# Patient Record
Sex: FEMALE | Race: WHITE | NOT HISPANIC OR LATINO | Employment: PART TIME | URBAN - METROPOLITAN AREA
[De-identification: names, ages, dates, MRNs, and addresses within clinical notes are randomized per-mention and may not be internally consistent; named-entity substitution may affect disease eponyms.]

---
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
## 2019-03-05 ENCOUNTER — OFFICE VISIT (OUTPATIENT)
Dept: MEDICAL GROUP | Facility: PHYSICIAN GROUP | Age: 19
End: 2019-03-05
Payer: COMMERCIAL

## 2019-03-05 VITALS
BODY MASS INDEX: 21.21 KG/M2 | OXYGEN SATURATION: 97 % | SYSTOLIC BLOOD PRESSURE: 120 MMHG | TEMPERATURE: 97.8 F | WEIGHT: 132 LBS | HEART RATE: 70 BPM | HEIGHT: 66 IN | DIASTOLIC BLOOD PRESSURE: 70 MMHG | RESPIRATION RATE: 14 BRPM

## 2019-03-05 DIAGNOSIS — K64.2 GRADE III HEMORRHOIDS: ICD-10-CM

## 2019-03-05 PROCEDURE — 99213 OFFICE O/P EST LOW 20 MIN: CPT | Performed by: NURSE PRACTITIONER

## 2019-03-05 RX ORDER — HYDROCORTISONE ACETATE 25 MG/1
25 SUPPOSITORY RECTAL EVERY 12 HOURS
Qty: 20 SUPPOSITORY | Refills: 0 | Status: SHIPPED | OUTPATIENT
Start: 2019-03-05 | End: 2019-07-15 | Stop reason: SDUPTHER

## 2019-03-05 NOTE — PROGRESS NOTES
Chief Complaint   Patient presents with   • Hemorrhoids     x 3 months; bright blood; first time        HISTORY OF THE PRESENT ILLNESS: This is a 18 y.o. female established patient who presents today for follow up.    Rectal pain  This is a new problem. Patient presents today with concerns for mild rectal pain acute onset 3 months ago. She was placed on Cefdinir at this time for otitis media. Her infection cleared, but she had frequent diarrhea while on this medication. She began to notice some swelling and pain in her rectal area which began at this time and has continued until today. She also has noticed streaks of blood when wiping, but denies any melanotic stools. Notes associated itching and irritation. She does not have any straining while using the bathroom.        Past Medical History:   Diagnosis Date   • Anxiety    • Depression    • Mononucleosis        History reviewed. No pertinent surgical history.    Family Status   Relation Status   • Mo Alive   • Fa Alive   • MGMo         hepatitis/liver CA   • Bro Alive     Family History   Problem Relation Age of Onset   • Hypertension Mother    • Psychiatry Mother         anxiety       Social History   Substance Use Topics   • Smoking status: Former Smoker     Years: 2.00     Types: Cigarettes     Quit date: 2018   • Smokeless tobacco: Never Used      Comment: one a day    • Alcohol use No       Allergies: Patient has no known allergies.    Current Outpatient Prescriptions Ordered in [facility]   Medication Sig Dispense Refill   • hydrocortisone (ANUSOL-HC) 25 MG Suppos Insert 1 Suppository in rectum every 12 hours. 20 Suppository 0   • SPRINTEC 28 0.25-35 MG-MCG per tablet Take 1 Tab by mouth every day. 28 Tab 11   • cefdinir (OMNICEF) 300 MG Cap Take 1 Cap by mouth 2 times a day. 20 Cap 0     No current Epic-ordered facility-administered medications on file.        Review of Systems   Gastrointestinal: Rectal pain, irritation, and bleeding.  Negative for  "melanotic stools, heartburn, nausea, vomiting and abdominal pain.   All other systems reviewed and are negative except as in HPI.    Exam: Blood pressure 120/70, pulse 70, temperature 36.6 °C (97.8 °F), temperature source Temporal, resp. rate 14, height 1.676 m (5' 6\"), weight 59.9 kg (132 lb), last menstrual period 02/20/2019, SpO2 97 %, not currently breastfeeding.  General:  Normal appearing. No distress.  Pulmonary:  Clear to ausculation.  Normal effort. No rales, ronchi, or wheezing.  Cardiovascular:  Regular rate and rhythm without murmur. Carotid and radial pulses are intact and equal bilaterally.  Rectal: Small soft mass at 7 o'clock on her anus with some redness. No fissures noted. Mild tenderness to palpation.  Neurologic:  Grossly nonfocal  Skin:  Warm and dry.  No obvious lesions.  Musculoskeletal:  Normal gait. No extremity cyanosis, clubbing, or edema.  Psych:  Normal mood and affect. Alert and oriented x3. Judgment and insight is normal.      PLAN:    1. Grade I hemorrhoids  Patient's story and physical exam are consistent with hemorrhoids. Provided with prescription for hydrocortisone and provided with application instructions.  - hydrocortisone (ANUSOL-HC) 25 MG Suppos; Insert 1 Suppository in rectum every 12 hours.  Dispense: 20 Suppository; Refill: 0       Please note that this dictation was created using voice recognition software. I have made every reasonable attempt to correct obvious errors, but I expect that there are errors of grammar and possibly content that I did not discover before finalizing the note.    Follow-up as needed. Patient is encouraged to be seen in the emergency room for chest pain, palpitations, shortness of breath, dizziness, severe abdominal pain or other concerning symptoms.      Assessment/Plan  1. Grade III hemorrhoids  hydrocortisone (ANUSOL-HC) 25 MG Suppos        IAaron (Scribe), am scribing for, and in the presence of, Chemo Caceres, " SHARI    Electronically signed by: Aaron Us (Scribe), 3/5/2019    Chemo JAMES, SHARI personally performed the services described in this documentation, as scribed by Aaron Us in my presence, and it is both accurate and complete.

## 2019-07-15 DIAGNOSIS — Z30.41 ENCOUNTER FOR SURVEILLANCE OF CONTRACEPTIVE PILLS: ICD-10-CM

## 2019-07-15 DIAGNOSIS — K64.2 GRADE III HEMORRHOIDS: ICD-10-CM

## 2019-07-15 RX ORDER — HYDROCORTISONE ACETATE 25 MG/1
25 SUPPOSITORY RECTAL EVERY 12 HOURS
Qty: 20 SUPPOSITORY | Refills: 0 | Status: SHIPPED
Start: 2019-07-15 | End: 2020-03-26 | Stop reason: CLARIF

## 2019-09-12 ENCOUNTER — APPOINTMENT (OUTPATIENT)
Dept: MEDICAL GROUP | Facility: PHYSICIAN GROUP | Age: 19
End: 2019-09-12
Payer: COMMERCIAL

## 2019-09-19 ENCOUNTER — OFFICE VISIT (OUTPATIENT)
Dept: MEDICAL GROUP | Facility: PHYSICIAN GROUP | Age: 19
End: 2019-09-19
Payer: COMMERCIAL

## 2019-09-19 ENCOUNTER — TELEPHONE (OUTPATIENT)
Dept: MEDICAL GROUP | Facility: PHYSICIAN GROUP | Age: 19
End: 2019-09-19

## 2019-09-19 VITALS
RESPIRATION RATE: 18 BRPM | WEIGHT: 131.4 LBS | OXYGEN SATURATION: 98 % | HEIGHT: 66 IN | BODY MASS INDEX: 21.12 KG/M2 | DIASTOLIC BLOOD PRESSURE: 78 MMHG | SYSTOLIC BLOOD PRESSURE: 118 MMHG | TEMPERATURE: 97.8 F | HEART RATE: 92 BPM

## 2019-09-19 DIAGNOSIS — K64.2 GRADE III HEMORRHOIDS: ICD-10-CM

## 2019-09-19 PROCEDURE — 99213 OFFICE O/P EST LOW 20 MIN: CPT | Performed by: NURSE PRACTITIONER

## 2019-09-19 SDOH — HEALTH STABILITY: MENTAL HEALTH: HOW OFTEN DO YOU HAVE 6 OR MORE DRINKS ON ONE OCCASION?: NEVER

## 2019-09-19 ASSESSMENT — PATIENT HEALTH QUESTIONNAIRE - PHQ9
2. FEELING DOWN, DEPRESSED, IRRITABLE, OR HOPELESS: NOT AT ALL
9. THOUGHTS THAT YOU WOULD BE BETTER OFF DEAD, OR OF HURTING YOURSELF: NOT AT ALL
7. TROUBLE CONCENTRATING ON THINGS, SUCH AS READING THE NEWSPAPER OR WATCHING TELEVISION: NOT AT ALL
SUM OF ALL RESPONSES TO PHQ QUESTIONS 1-9: 0
5. POOR APPETITE OR OVEREATING: NOT AT ALL
1. LITTLE INTEREST OR PLEASURE IN DOING THINGS: NOT AT ALL
6. FEELING BAD ABOUT YOURSELF - OR THAT YOU ARE A FAILURE OR HAVE LET YOURSELF OR YOUR FAMILY DOWN: NOT AL ALL
SUM OF ALL RESPONSES TO PHQ9 QUESTIONS 1 AND 2: 0
4. FEELING TIRED OR HAVING LITTLE ENERGY: NOT AT ALL
8. MOVING OR SPEAKING SO SLOWLY THAT OTHER PEOPLE COULD HAVE NOTICED. OR THE OPPOSITE, BEING SO FIGETY OR RESTLESS THAT YOU HAVE BEEN MOVING AROUND A LOT MORE THAN USUAL: NOT AT ALL
3. TROUBLE FALLING OR STAYING ASLEEP OR SLEEPING TOO MUCH: NOT AT ALL

## 2019-09-19 NOTE — TELEPHONE ENCOUNTER
Patient called and her medication Lidocaine-Hydrocortisone Ace 3-0.5 % Cream. Patient states insurance don't cover it. Patient wants to know if there is something different she may try.

## 2019-09-19 NOTE — ASSESSMENT & PLAN NOTE
Chronic in nature.  Continued issue. States it has gotten worse. States it is itchy and burning over the last 3 weeks. States since it returned, she started treating it again  It has gotten worse and not improved. States that she has been using the suppositories regularly but states that unlike the first time it has not improved her symptoms. States she is only noticing small amounts of blood.

## 2019-09-19 NOTE — TELEPHONE ENCOUNTER
She can get OTC preparation H and topical lidocaine which is also available over the counter, they would be applied at the same time. Tucks are also a great option to help with pain relief.

## 2019-09-19 NOTE — PROGRESS NOTES
Chief Complaint   Patient presents with   • Hemorrhoids       HISTORY OF PRESENT ILLNESS: Patient is a 18 y.o. female established patient who presents today to discuss recurrent hemorrhoids.    Grade III hemorrhoids  Chronic in nature.  Continued issue. States it has gotten worse. States it is itchy and burning over the last 3 weeks. States since it returned, she started treating it again  It has gotten worse and not improved. States that she has been using the suppositories regularly but states that unlike the first time it has not improved her symptoms. States she is only noticing small amounts of blood.      Patient Active Problem List    Diagnosis Date Noted   • Grade III hemorrhoids 2019   • Changing nevus 2018   • Chronic ear pain, bilateral 2018   • Moderate episode of recurrent major depressive disorder (HCC) 2018   • Generalized abdominal pain 2018       Allergies:Patient has no known allergies.    Current Outpatient Medications   Medication Sig Dispense Refill   • Lidocaine-Hydrocortisone Ace 3-0.5 % Cream 1 Application by Apply externally route 3 times a day. 1 Tube 1   • SPRINTEC 28 0.25-35 MG-MCG per tablet TAKE 1 TABLET BY MOUTH ONCE DAILY 84 Tab 0   • hydrocortisone (ANUSOL-HC) 25 MG Suppos Insert 1 Suppository in rectum every 12 hours. 20 Suppository 0   • cefdinir (OMNICEF) 300 MG Cap Take 1 Cap by mouth 2 times a day. 20 Cap 0     No current facility-administered medications for this visit.        Social History     Tobacco Use   • Smoking status: Former Smoker     Years: 2.00     Types: Cigarettes     Last attempt to quit: 2018     Years since quittin.3   • Smokeless tobacco: Never Used   • Tobacco comment: one a day    Substance Use Topics   • Alcohol use: No     Binge frequency: Never   • Drug use: No       Family Status   Relation Name Status   • Mo  Alive   • Fa  Alive   • MGMo          hepatitis/liver CA   • Bro twin Alive     Family History  "  Problem Relation Age of Onset   • Hypertension Mother    • Psychiatric Illness Mother         anxiety       Review of Systems:   Constitutional:  Negative for fever, chills, weight loss and malaise/fatigue.   Respiratory:  Negative for cough, sputum production, shortness of breath and wheezing.    Cardiovascular:  Negative for chest pain, palpitations, orthopnea and leg swelling.   Gastrointestinal:  Negative for heartburn, nausea, vomiting and abdominal pain.   Genitourinary:  Negative for dysuria, urgency and frequency.   Skin:  Negative for rash and positive itching.   Neurological:  Negative for dizziness, tingling, tremors, sensory change, focal weakness and headaches.   Endo/Heme/Allergies:  Does not bruise/bleed easily.   Psychiatric/Behavioral:  Negative for depression, suicidal ideas and memory loss.  The patient is not nervous/anxious and does not have insomnia.    All other systems reviewed and are negative except as in HPI.    Exam:  /78 (BP Location: Left arm, Patient Position: Sitting, BP Cuff Size: Adult)   Pulse 92   Temp 36.6 °C (97.8 °F) (Temporal)   Resp 18   Ht 1.676 m (5' 6\")   Wt 59.6 kg (131 lb 6.4 oz)   SpO2 98%   General:  Normal appearing. No distress.  Abdomen:  Soft, nontender, nondistended. Normal bowel sounds. Liver and spleen are not palpable  Neurologic:  Grossly nonfocal  Lymph:  No cervical, supraclavicular or axillary lymph nodes are palpable  Skin:  Warm and dry.  No obvious lesions.  Rectal: no erythema around the anus, sphincter tone intact.  Noted larger hemorrhoid skin tag anterior anus, posterior there is a noted purple soft raised vessel which is tender to touch. Internal exam with palpable swollen areas.   Musculoskeletal:  Normal gait. No extremity cyanosis, clubbing, or edema.  Psych:  Normal mood and affect. Alert and oriented x3. Judgment and insight is normal.      PLAN:    1. Grade III hemorrhoids  Plan to try hydrocortisone with lidocaine for relief of " symptoms patient will increase fiber in her diet, discussed multiple other conservative measures including sitz baths patient will contact this provider if symptoms do not resolve or improve within 2 weeks discussed that related to worsening we may need to consider referral for ligation.  - Lidocaine-Hydrocortisone Ace 3-0.5 % Cream; 1 Application by Apply externally route 3 times a day.  Dispense: 1 Tube; Refill: 1    Follow-up as needed. Patient is encouraged to be seen in the emergency room for chest pain, palpitations, shortness of breath, dizziness, severe abdominal pain or other concerning symptoms.    Please note that this dictation was created using voice recognition software. I have made every reasonable attempt to correct obvious errors, but I expect that there are errors of grammar and possibly content that I did not discover before finalizing the note.    Assessment/Plan:  1. Grade III hemorrhoids  Lidocaine-Hydrocortisone Ace 3-0.5 % Cream          I have placed the below orders and discussed them with an approved delegating provider. The MA is performing the below orders under the direction of Dr. Bueno.

## 2019-09-20 NOTE — TELEPHONE ENCOUNTER
Phone Number Called: [phone] (home)       Call outcome: left message for patient to call back regarding message below    Message: left a vm for patient to return our call.

## 2019-10-18 ENCOUNTER — TELEPHONE (OUTPATIENT)
Dept: MEDICAL GROUP | Facility: PHYSICIAN GROUP | Age: 19
End: 2019-10-18

## 2019-10-18 DIAGNOSIS — K64.2 GRADE III HEMORRHOIDS: ICD-10-CM

## 2019-10-18 NOTE — TELEPHONE ENCOUNTER
VOICEMAIL  1. Caller Name: Flori                     Call Back Number: [phone]    2. Message: Patient LVM stating she saw Chemo on 9/19/19 for hemorrhoids & was asked to call back for GI referral if not improvement.  Patient requesting referral now.    3. Patient approves office to leave a detailed voicemail/MyChart message: yes    Referral pended, routing to PCP to sign.

## 2019-10-18 NOTE — TELEPHONE ENCOUNTER
Phone Number Called: [phone] (home)     Call outcome: spoke to patient regarding message below    Message: Patient informed Chemo is out of the office & we will call her once referral has been signed.

## 2019-10-28 ENCOUNTER — TELEPHONE (OUTPATIENT)
Dept: MEDICAL GROUP | Facility: PHYSICIAN GROUP | Age: 19
End: 2019-10-28

## 2019-10-28 DIAGNOSIS — K64.2 GRADE III HEMORRHOIDS: ICD-10-CM

## 2019-10-28 NOTE — TELEPHONE ENCOUNTER
1. Caller Name: Flori Rosario                                           Call Back Number: [phone] (home)        Patient called and was requesting to get a referral to [facility]. [facility] does not do external hemorrhoids  only internal. Please Advise. LM

## 2020-03-26 ENCOUNTER — OFFICE VISIT (OUTPATIENT)
Dept: URGENT CARE | Facility: CLINIC | Age: 20
End: 2020-03-26
Payer: COMMERCIAL

## 2020-03-26 ENCOUNTER — HOSPITAL ENCOUNTER (OUTPATIENT)
Facility: MEDICAL CENTER | Age: 20
End: 2020-03-26
Attending: PHYSICIAN ASSISTANT
Payer: COMMERCIAL

## 2020-03-26 VITALS
RESPIRATION RATE: 16 BRPM | WEIGHT: 130 LBS | HEART RATE: 71 BPM | DIASTOLIC BLOOD PRESSURE: 80 MMHG | HEIGHT: 65 IN | TEMPERATURE: 99.1 F | SYSTOLIC BLOOD PRESSURE: 132 MMHG | OXYGEN SATURATION: 98 % | BODY MASS INDEX: 21.66 KG/M2

## 2020-03-26 DIAGNOSIS — N12 PYELONEPHRITIS: ICD-10-CM

## 2020-03-26 DIAGNOSIS — N89.8 VAGINAL ITCHING: ICD-10-CM

## 2020-03-26 LAB
APPEARANCE UR: CLEAR
BILIRUB UR STRIP-MCNC: NORMAL MG/DL
COLOR UR AUTO: YELLOW
GLUCOSE UR STRIP.AUTO-MCNC: NORMAL MG/DL
INT CON NEG: NORMAL
INT CON POS: NORMAL
KETONES UR STRIP.AUTO-MCNC: NORMAL MG/DL
LEUKOCYTE ESTERASE UR QL STRIP.AUTO: NORMAL
NITRITE UR QL STRIP.AUTO: NORMAL
PH UR STRIP.AUTO: 5.5 [PH] (ref 5–8)
POC URINE PREGNANCY TEST: NEGATIVE
PROT UR QL STRIP: 30 MG/DL
RBC UR QL AUTO: NORMAL
SP GR UR STRIP.AUTO: <=1.005
UROBILINOGEN UR STRIP-MCNC: 0.2 MG/DL

## 2020-03-26 PROCEDURE — 87086 URINE CULTURE/COLONY COUNT: CPT

## 2020-03-26 PROCEDURE — 87077 CULTURE AEROBIC IDENTIFY: CPT

## 2020-03-26 PROCEDURE — 99214 OFFICE O/P EST MOD 30 MIN: CPT | Performed by: PHYSICIAN ASSISTANT

## 2020-03-26 PROCEDURE — 81025 URINE PREGNANCY TEST: CPT | Performed by: PHYSICIAN ASSISTANT

## 2020-03-26 PROCEDURE — 81002 URINALYSIS NONAUTO W/O SCOPE: CPT | Performed by: PHYSICIAN ASSISTANT

## 2020-03-26 PROCEDURE — 87186 SC STD MICRODIL/AGAR DIL: CPT

## 2020-03-26 RX ORDER — SULFAMETHOXAZOLE AND TRIMETHOPRIM 800; 160 MG/1; MG/1
1 TABLET ORAL EVERY 12 HOURS
Qty: 14 TAB | Refills: 0 | Status: SHIPPED | OUTPATIENT
Start: 2020-03-26 | End: 2020-04-02

## 2020-03-26 RX ORDER — FLUCONAZOLE 150 MG/1
150 TABLET ORAL DAILY
Qty: 1 TAB | Refills: 0 | Status: SHIPPED | OUTPATIENT
Start: 2020-03-26 | End: 2021-06-14

## 2020-03-26 RX ORDER — NORGESTIMATE AND ETHINYL ESTRADIOL 0.25-0.035
KIT ORAL
COMMUNITY
Start: 2017-05-08 | End: 2020-07-06

## 2020-03-26 ASSESSMENT — ENCOUNTER SYMPTOMS
VOMITING: 0
CHILLS: 0
FEVER: 0
EYE REDNESS: 0
FLANK PAIN: 1
BACK PAIN: 1
CHANGE IN BOWEL HABIT: 0
EYE DISCHARGE: 0
DIARRHEA: 0

## 2020-03-26 NOTE — PROGRESS NOTES
"Subjective:      Flori Rosario is a 19 y.o. female who presents with Urinary Frequency (pain with urination, lower back pain x4 days )            Patient is a 19-year-old female who presents to urgent care with dysuria for the last 4 to 5 days.  Patient also reports right-sided back pain that developed over the last 2 days.  She does report lower abdominal pressure as well.  She does report history of similar symptoms in the past with UTIs however the back pain appears to be new.  She denies any blood in her urine.  She is with her mother today who also provides history.  Last normal menstrual cycle was approximately 10 days ago.  Patient does report slight vaginal itching however denies notable discharge.    Urinary Frequency   This is a new problem. The current episode started in the past 7 days. The problem occurs constantly. The problem has been gradually worsening. Associated symptoms include urinary symptoms. Pertinent negatives include no change in bowel habit, chills, fever, rash or vomiting. Nothing aggravates the symptoms. Treatments tried: Increased fluids.  The treatment provided mild relief.       Review of Systems   Constitutional: Negative for chills, fever and malaise/fatigue.   Eyes: Negative for discharge and redness.   Gastrointestinal: Negative for change in bowel habit, diarrhea and vomiting.        Lower abd. Pressure.    Genitourinary: Positive for dysuria, flank pain, frequency and urgency. Negative for hematuria.   Musculoskeletal: Positive for back pain.   Skin: Negative for itching and rash.   All other systems reviewed and are negative.         Objective:     /80   Pulse 71   Temp 37.3 °C (99.1 °F) (Temporal)   Resp 16   Ht 1.651 m (5' 5\")   Wt 59 kg (130 lb)   LMP 03/17/2020 (Exact Date)   SpO2 98%   BMI 21.63 kg/m²    PMH:  has a past medical history of Anxiety, Depression, and Mononucleosis. She also has no past medical history of Allergy, GERD (gastroesophageal reflux " disease), Ulcer, or Urinary tract infection, site not specified.  MEDS: Reviewed .   ALLERGIES: No Known Allergies  SURGHX: No past surgical history on file.  SOCHX:  reports that she quit smoking about 21 months ago. Her smoking use included cigarettes. She quit after 2.00 years of use. She has never used smokeless tobacco. She reports that she does not drink alcohol or use drugs.  FH: Family history was reviewed, no pertinent findings to report    Physical Exam  Vitals signs reviewed.   Constitutional:       Appearance: She is well-developed.   HENT:      Head: Normocephalic and atraumatic.   Eyes:      Pupils: Pupils are equal, round, and reactive to light.   Neck:      Musculoskeletal: Normal range of motion and neck supple.   Cardiovascular:      Rate and Rhythm: Normal rate and regular rhythm.      Heart sounds: No murmur.   Pulmonary:      Effort: Pulmonary effort is normal. No respiratory distress.      Breath sounds: Normal breath sounds.   Abdominal:      General: Bowel sounds are normal. There is no distension.      Palpations: Abdomen is soft.      Tenderness: There is no abdominal tenderness. There is right CVA tenderness.   Musculoskeletal: Normal range of motion.   Skin:     General: Skin is warm and dry.   Neurological:      Mental Status: She is alert and oriented to person, place, and time.   Psychiatric:         Behavior: Behavior normal.                 Assessment/Plan:       1. Pyelonephritis  - cefTRIAXone (ROCEPHIN) 500 mg, lidocaine (XYLOCAINE) 1 % 1.8 mL for IM use  - URINE CULTURE(NEW); Future  - sulfamethoxazole-trimethoprim (BACTRIM DS) 800-160 MG tablet; Take 1 Tab by mouth every 12 hours for 7 days.  Dispense: 14 Tab; Refill: 0  - POCT Urinalysis  - POCT Pregnancy    2. Vaginal itching  - fluconazole (DIFLUCAN) 150 MG tablet; Take 1 Tab by mouth every day.  Dispense: 1 Tab; Refill: 0  - POCT Urinalysis  - POCT Pregnancy    Patient with positive right-sided CVAT consistent with  pyelonephritis.  We will start the patient on the above at this time of which patient is with slight vaginal itching and does report history of antibiotic-induced yeast infections.  We will also provided Diflucan as well.  Patient is to increase her water intake, as well.    Patient and/or guardian given precautionary s/sx that mandate immediate follow up and evaluation in the ED. Advised of risks of not doing so.  Side effects of the above medications discussed.   DDX, Supportive care, and indications for immediate follow-up discussed with patient.    Instructed to return to clinic or nearest emergency department if we are not available for any change in condition, further concerns, or worsening of symptoms.    The patient and/or guardian demonstrated a good understanding and agreed with the treatment plan.    Please note that this dictation was created using voice recognition software. I have made every reasonable attempt to correct obvious errors, but I expect that there are errors of grammar and possibly content that I did not discover before finalizing the note.

## 2020-03-29 LAB
BACTERIA UR CULT: ABNORMAL
BACTERIA UR CULT: ABNORMAL
SIGNIFICANT IND 70042: ABNORMAL
SITE SITE: ABNORMAL
SOURCE SOURCE: ABNORMAL

## 2020-04-04 ENCOUNTER — TELEPHONE (OUTPATIENT)
Dept: URGENT CARE | Facility: CLINIC | Age: 20
End: 2020-04-04

## 2020-04-05 NOTE — TELEPHONE ENCOUNTER
Pt's mother called stating that pt's back/side pain has returned since previous visit as well as a fever. I verified that pt's urine culture came back (positive for e-coli) and sensitive (was sensitive to everything).     Advised mother to return for reevaluation. Transferred to nurse hotline for further, more clinical questions.

## 2020-09-29 NOTE — TELEPHONE ENCOUNTER
90 day refill sent to pharmacy, no additional refills will be authorized without OV. Please contact the patient. Thank you!

## 2021-06-14 ENCOUNTER — OFFICE VISIT (OUTPATIENT)
Dept: MEDICAL GROUP | Facility: PHYSICIAN GROUP | Age: 21
End: 2021-06-14
Payer: COMMERCIAL

## 2021-06-14 VITALS
RESPIRATION RATE: 16 BRPM | BODY MASS INDEX: 23.29 KG/M2 | WEIGHT: 139.8 LBS | DIASTOLIC BLOOD PRESSURE: 66 MMHG | HEART RATE: 90 BPM | OXYGEN SATURATION: 96 % | TEMPERATURE: 98.7 F | SYSTOLIC BLOOD PRESSURE: 130 MMHG | HEIGHT: 65 IN

## 2021-06-14 DIAGNOSIS — H92.03 CHRONIC EAR PAIN, BILATERAL: ICD-10-CM

## 2021-06-14 DIAGNOSIS — D22.9 CHANGING NEVUS: ICD-10-CM

## 2021-06-14 DIAGNOSIS — Z30.41 ENCOUNTER FOR SURVEILLANCE OF CONTRACEPTIVE PILLS: ICD-10-CM

## 2021-06-14 DIAGNOSIS — G89.29 CHRONIC EAR PAIN, BILATERAL: ICD-10-CM

## 2021-06-14 PROBLEM — R10.84 GENERALIZED ABDOMINAL PAIN: Status: RESOLVED | Noted: 2018-03-12 | Resolved: 2021-06-14

## 2021-06-14 PROBLEM — F33.1 MODERATE EPISODE OF RECURRENT MAJOR DEPRESSIVE DISORDER (HCC): Status: RESOLVED | Noted: 2018-03-12 | Resolved: 2021-06-14

## 2021-06-14 PROCEDURE — 99213 OFFICE O/P EST LOW 20 MIN: CPT | Performed by: NURSE PRACTITIONER

## 2021-06-14 ASSESSMENT — PATIENT HEALTH QUESTIONNAIRE - PHQ9
5. POOR APPETITE OR OVEREATING: NOT AT ALL
9. THOUGHTS THAT YOU WOULD BE BETTER OFF DEAD, OR OF HURTING YOURSELF: NOT AT ALL
2. FEELING DOWN, DEPRESSED, IRRITABLE, OR HOPELESS: NOT AT ALL
3. TROUBLE FALLING OR STAYING ASLEEP OR SLEEPING TOO MUCH: NOT AT ALL
7. TROUBLE CONCENTRATING ON THINGS, SUCH AS READING THE NEWSPAPER OR WATCHING TELEVISION: NOT AT ALL
SUM OF ALL RESPONSES TO PHQ QUESTIONS 1-9: 0
CLINICAL INTERPRETATION OF PHQ2 SCORE: 0
SUM OF ALL RESPONSES TO PHQ9 QUESTIONS 1 AND 2: 0
6. FEELING BAD ABOUT YOURSELF - OR THAT YOU ARE A FAILURE OR HAVE LET YOURSELF OR YOUR FAMILY DOWN: NOT AL ALL
8. MOVING OR SPEAKING SO SLOWLY THAT OTHER PEOPLE COULD HAVE NOTICED. OR THE OPPOSITE, BEING SO FIGETY OR RESTLESS THAT YOU HAVE BEEN MOVING AROUND A LOT MORE THAN USUAL: NOT AT ALL
4. FEELING TIRED OR HAVING LITTLE ENERGY: NOT AT ALL
1. LITTLE INTEREST OR PLEASURE IN DOING THINGS: NOT AT ALL

## 2021-06-14 NOTE — ASSESSMENT & PLAN NOTE
Chronic in nature.  Patient states that the mole has not changed any further.  That she is not currently following up regarding this issue.

## 2021-06-14 NOTE — ASSESSMENT & PLAN NOTE
Chronic in nature.  Patient states that this continues to happen but that it is very intermittent and as such she is not concerned about at this time.

## 2021-06-14 NOTE — ASSESSMENT & PLAN NOTE
Patient states that Sprintec continues to work well.  She is not having any bleeding outside of when she would expect based on her pill pack.  Denies any side effects of the medication.  States that she would like to continue the same medication.

## 2021-06-14 NOTE — PROGRESS NOTES
Chief Complaint   Patient presents with   • Medication Refill     BC        HISTORY OF PRESENT ILLNESS: Patient is a 20 y.o. female established patient who presents today to fill contraceptives.    Encounter for surveillance of contraceptive pills  Patient states that Sprintec continues to work well.  She is not having any bleeding outside of when she would expect based on her pill pack.  Denies any side effects of the medication.  States that she would like to continue the same medication.    Chronic ear pain, bilateral  Chronic in nature.  Patient states that this continues to happen but that it is very intermittent and as such she is not concerned about at this time.    Changing nevus  Chronic in nature.  Patient states that the mole has not changed any further.  That she is not currently following up regarding this issue.      Patient Active Problem List    Diagnosis Date Noted   • Encounter for surveillance of contraceptive pills 2021   • Grade III hemorrhoids 2019   • Changing nevus 2018   • Chronic ear pain, bilateral 2018       Allergies:Patient has no known allergies.    Current Outpatient Medications   Medication Sig Dispense Refill   • SPRINTEC 28 0.25-35 MG-MCG per tablet Take 1 tablet by mouth every day. 84 tablet 3     No current facility-administered medications for this visit.       Social History     Tobacco Use   • Smoking status: Former Smoker     Years: 2.00     Types: Cigarettes     Quit date: 2018     Years since quitting: 3.0   • Smokeless tobacco: Never Used   • Tobacco comment: one a day    Vaping Use   • Vaping Use: Never used   Substance Use Topics   • Alcohol use: No   • Drug use: No       Family Status   Relation Name Status   • Mo  Alive   • Fa  Alive   • MGMo          hepatitis/liver CA   • Bro twin Alive     Family History   Problem Relation Age of Onset   • Hypertension Mother    • Psychiatric Illness Mother         anxiety       ROS:   Patient  "denies headache, blurry vision, dizziness, chest pain, shortness of breath, abdominal pain, nausea, vomiting, change in level of consciousness.  All other systems reviewed and are negative except as in HPI.    Exam:  /66 (BP Location: Left arm, Patient Position: Sitting, BP Cuff Size: Adult)   Pulse 90   Temp 37.1 °C (98.7 °F) (Temporal)   Resp 16   Ht 1.651 m (5' 5\")   Wt 63.4 kg (139 lb 12.8 oz)   SpO2 96%   General:  Normal appearing. No distress.  HEENT: Normocephalic. Eyes conjunctiva clear lids without ptosis, pupils equal and reactive to light accommodation, ears normal shape and contour, canals are clear bilaterally, tympanic membranes are benign, nasal mucosa benign, oropharynx is without erythema, edema or exudates.   Neck:  Supple without JVD or bruit. Thyroid is not enlarged.  Pulmonary:  Clear to ausculation.  Normal effort. No rales, ronchi, or wheezing.  Cardiovascular:  Regular rate and rhythm without murmur. Carotid and radial pulses are intact and equal bilaterally.  Abdomen:  Soft, nontender, nondistended. Normal bowel sounds. Liver and spleen are not palpable  Neurologic:  Grossly nonfocal  Lymph:  No cervical, supraclavicular or axillary lymph nodes are palpable  Skin:  Warm and dry.  No obvious lesions.  Musculoskeletal:  Normal gait. No extremity cyanosis, clubbing, or edema.  Psych:  Normal mood and affect. Alert and oriented x3. Judgment and insight is normal.      PLAN:    1. Encounter for surveillance of contraceptive pills  Birth control pills are refilled they are working well without side effects.  - SPRINTEC 28 0.25-35 MG-MCG per tablet; Take 1 tablet by mouth every day.  Dispense: 84 tablet; Refill: 3    2. Chronic ear pain, bilateral  We will continue to monitor this    3. Changing nevus  Plan to continue to monitor this    Consult patient risks regard to need for initial Pap smear.  Patient states that she will schedule this soon once she figures out her work " schedule.      Return in about 3 months (around 9/14/2021), or if symptoms worsen or fail to improve, for Annual Well[facility]Woman.    Please note that this dictation was created using voice recognition software. I have made every reasonable attempt to correct obvious errors, but I expect that there are errors of grammar and possibly content that I did not discover before finalizing the note.

## 2021-08-09 ENCOUNTER — OFFICE VISIT (OUTPATIENT)
Dept: MEDICAL GROUP | Facility: PHYSICIAN GROUP | Age: 21
End: 2021-08-09
Payer: COMMERCIAL

## 2021-08-09 ENCOUNTER — HOSPITAL ENCOUNTER (OUTPATIENT)
Facility: MEDICAL CENTER | Age: 21
End: 2021-08-09
Attending: FAMILY MEDICINE
Payer: COMMERCIAL

## 2021-08-09 VITALS
WEIGHT: 139.2 LBS | HEIGHT: 65 IN | SYSTOLIC BLOOD PRESSURE: 122 MMHG | TEMPERATURE: 99.2 F | DIASTOLIC BLOOD PRESSURE: 78 MMHG | BODY MASS INDEX: 23.19 KG/M2 | HEART RATE: 90 BPM | OXYGEN SATURATION: 98 %

## 2021-08-09 DIAGNOSIS — R39.9 UTI SYMPTOMS: ICD-10-CM

## 2021-08-09 DIAGNOSIS — R30.0 DYSURIA: ICD-10-CM

## 2021-08-09 LAB
APPEARANCE UR: CLEAR
BILIRUB UR STRIP-MCNC: NEGATIVE MG/DL
COLOR UR AUTO: YELLOW
GLUCOSE UR STRIP.AUTO-MCNC: NEGATIVE MG/DL
KETONES UR STRIP.AUTO-MCNC: NEGATIVE MG/DL
LEUKOCYTE ESTERASE UR QL STRIP.AUTO: NORMAL
NITRITE UR QL STRIP.AUTO: NEGATIVE
PH UR STRIP.AUTO: 7.5 [PH] (ref 5–8)
PROT UR QL STRIP: NEGATIVE MG/DL
RBC UR QL AUTO: NORMAL
SP GR UR STRIP.AUTO: 1.02
UROBILINOGEN UR STRIP-MCNC: 0.2 MG/DL

## 2021-08-09 PROCEDURE — 99213 OFFICE O/P EST LOW 20 MIN: CPT | Performed by: FAMILY MEDICINE

## 2021-08-09 PROCEDURE — 87077 CULTURE AEROBIC IDENTIFY: CPT

## 2021-08-09 PROCEDURE — 99000 SPECIMEN HANDLING OFFICE-LAB: CPT | Performed by: FAMILY MEDICINE

## 2021-08-09 PROCEDURE — 81002 URINALYSIS NONAUTO W/O SCOPE: CPT | Performed by: FAMILY MEDICINE

## 2021-08-09 PROCEDURE — 87086 URINE CULTURE/COLONY COUNT: CPT

## 2021-08-09 PROCEDURE — 87186 SC STD MICRODIL/AGAR DIL: CPT

## 2021-08-09 RX ORDER — NITROFURANTOIN 25; 75 MG/1; MG/1
100 CAPSULE ORAL 2 TIMES DAILY
Qty: 10 CAPSULE | Refills: 0 | Status: SHIPPED | OUTPATIENT
Start: 2021-08-09 | End: 2021-08-14

## 2021-08-09 NOTE — PROGRESS NOTES
"  Chief Complaint   Patient presents with   • UTI     x3days, aching lower back, burning/hurts to pee,        HPI:  Symptom onset: 3 days ago   Current symptoms: Painful, urgent, frequent voids. No blood noted in urine.  Since onset symptoms are: Unchanged  Treatments tried: OTC antispasm med.  Associated symptoms: Negative for fever, flank pain, nausea and vomiting, vaginal discharge, pelvic pain.  History is negative for frequent UTI.     ROS:  Denies fever, chills, vomiting or abdominal pain.     OBJECTIVE:  /78 (BP Location: Left arm, Patient Position: Sitting, BP Cuff Size: Adult)   Pulse 90   Temp 37.3 °C (99.2 °F) (Temporal)   Ht 1.651 m (5' 5\")   Wt 63.1 kg (139 lb 3.2 oz)   SpO2 98%   Gen: Alert, NAD.  Chest: Lungs clear to auscultation, CV RRR.  Abdomen: Soft, tender in suprapubic region. No CVAT. Normal bowel sounds.     Lab Results   Component Value Date    POCCOLOR YELLOW 03/26/2020    POCAPPEAR CLEAR 03/26/2020    POCLEUKEST MODERATE 03/26/2020    POCNITRITE NEG 03/26/2020    POCUROBILIGE 0.2 03/26/2020    POCPROTEIN 30 03/26/2020    POCURPH 5.5 03/26/2020    POCBLOOD MODERATE 03/26/2020    POCSPGRV <=1.005 03/26/2020    POCKETONES NEG 03/26/2020    POCBILIRUBIN NEG 03/26/2020    POCGLUCUA NEG 03/26/2020          ASSESSMENT/PLAN:     1. UTI symptoms    2. Dysuria          1. Abnormal urine dipstick in office. Urine sent for culture. Start antibiotics.  2. Provided education to drink plenty of fluids, wipe front to back every void and bowel movement.   3. Return to clinic if symptoms not improving within 3-4 days or in case of vomiting, fever, increasing pain.  "

## 2021-08-12 ENCOUNTER — TELEPHONE (OUTPATIENT)
Dept: MEDICAL GROUP | Facility: PHYSICIAN GROUP | Age: 21
End: 2021-08-12

## 2021-08-12 NOTE — TELEPHONE ENCOUNTER
----- Message from Abhijit Galeas M.D. sent at 8/12/2021  1:41 PM PDT -----  Please call patient to let know:  Mild growth of E. coli.  Recommend continuing with antibiotic

## 2021-08-12 NOTE — TELEPHONE ENCOUNTER
Phone Number Called: [phone]      Call outcome: Spoke to patient regarding message below.    Message: Spoke patient regarding the results below:  Mild growth of E. coli.  Recommend continuing with antibiotic    Patient Acknowledged and will continue taking antibiotic.

## 2022-01-31 ENCOUNTER — OFFICE VISIT (OUTPATIENT)
Dept: URGENT CARE | Facility: CLINIC | Age: 22
End: 2022-01-31
Payer: COMMERCIAL

## 2022-01-31 ENCOUNTER — APPOINTMENT (OUTPATIENT)
Dept: MEDICAL GROUP | Facility: PHYSICIAN GROUP | Age: 22
End: 2022-01-31
Payer: COMMERCIAL

## 2022-01-31 VITALS
BODY MASS INDEX: 21.69 KG/M2 | HEART RATE: 116 BPM | HEIGHT: 66 IN | DIASTOLIC BLOOD PRESSURE: 70 MMHG | WEIGHT: 135 LBS | OXYGEN SATURATION: 97 % | TEMPERATURE: 98.7 F | RESPIRATION RATE: 16 BRPM | SYSTOLIC BLOOD PRESSURE: 132 MMHG

## 2022-01-31 DIAGNOSIS — K12.1 MOUTH ULCER: ICD-10-CM

## 2022-01-31 DIAGNOSIS — R21 RASH OF GENITAL AREA: ICD-10-CM

## 2022-01-31 PROCEDURE — 99213 OFFICE O/P EST LOW 20 MIN: CPT | Performed by: PHYSICIAN ASSISTANT

## 2022-01-31 RX ORDER — VALACYCLOVIR HYDROCHLORIDE 1 G/1
1000 TABLET, FILM COATED ORAL 2 TIMES DAILY
Qty: 14 TABLET | Refills: 0 | Status: SHIPPED | OUTPATIENT
Start: 2022-01-31 | End: 2022-02-07

## 2022-01-31 ASSESSMENT — ENCOUNTER SYMPTOMS
ABDOMINAL PAIN: 0
MYALGIAS: 1
SORE THROAT: 1
FLANK PAIN: 0
VAGINITIS: 1
VOMITING: 0
DIARRHEA: 0

## 2022-01-31 NOTE — PROGRESS NOTES
"Subjective     Flori Rosario is a 21 y.o. female who presents with Sore (x3days, sores in lips and mouth, painful, getting worse, sores in vaginal area, fever, body aches)            Patient is a 21-year-old female who presents to urgent care with \"sores to her mouth and sores to her genitalia for the last 3 days.  Patiently is sexually active with the same partner however reports sexual encounter approximately 1 week ago and approximately 2 to 3 days later she developed the above.  She does report that the genital rash is getting worse at this time.  Patient does report sore scattered throughout the inside of her mouth with a slight sore throat.  Patient does believe that her sexual partner does have herpes no known active lesions at this time.  Patient does have suspicion that she may have had genital lesions in the past however \"not like this \".  She denies any burning with urination.  She does readily admit she shaved her genital region prior to outbreak.  Patient denies any fevers but does report slight body aches.  Denies other lesions to the face, hands, feet or body.LNMP- 2 weeks ag.  Patient denies any vaginal discharge only the painful vaginal lesions.    Vaginitis  The patient's primary symptoms include a genital rash. This is a new problem. The current episode started in the past 7 days. The problem occurs constantly. The problem has been gradually worsening. She is not pregnant. Associated symptoms include rash and a sore throat. Pertinent negatives include no abdominal pain, diarrhea, dysuria, flank pain, frequency, hematuria, urgency or vomiting. She is sexually active. It is unknown whether or not her partner has an STD. She uses nothing for contraception.       Review of Systems   Constitutional: Positive for malaise/fatigue.   HENT: Positive for sore throat.    Gastrointestinal: Negative for abdominal pain, diarrhea and vomiting.   Genitourinary: Negative for dysuria, flank pain, frequency, " "hematuria and urgency.   Musculoskeletal: Positive for myalgias.   Skin: Positive for rash. Negative for itching.   All other systems reviewed and are negative.             Objective     /70 (BP Location: Left arm, Patient Position: Sitting, BP Cuff Size: Adult)   Pulse (!) 116   Temp 37.1 °C (98.7 °F) (Temporal)   Resp 16   Ht 1.676 m (5' 6\")   Wt 61.2 kg (135 lb)   SpO2 97%   BMI 21.79 kg/m²      Physical Exam  Vitals reviewed.   Constitutional:       General: She is not in acute distress.     Appearance: She is well-developed.   HENT:      Head: Normocephalic and atraumatic.      Comments: 4-5 ulcerative-like lesions to the oral mucosa with one lesion to the tongue.  Diffuse adenopathy noted.     Right Ear: External ear normal.      Left Ear: External ear normal.   Eyes:      Conjunctiva/sclera: Conjunctivae normal.      Pupils: Pupils are equal, round, and reactive to light.   Neck:      Trachea: No tracheal deviation.   Cardiovascular:      Rate and Rhythm: Normal rate.   Pulmonary:      Effort: Pulmonary effort is normal.   Genitourinary:     Comments: Scattered vesicular lesions to the labia and suprapubic region.  Some with ulcerations.  Musculoskeletal:         General: Normal range of motion.      Cervical back: Normal range of motion. No rigidity.   Lymphadenopathy:      Cervical: Cervical adenopathy present.   Skin:     General: Skin is warm.      Findings: Rash present.   Neurological:      Mental Status: She is alert and oriented to person, place, and time.      Coordination: Coordination normal.   Psychiatric:         Behavior: Behavior normal.         Thought Content: Thought content normal.         Judgment: Judgment normal.                             Assessment & Plan        1. Rash of genital area  - valacyclovir (VALTREX) 1 GM Tab; Take 1 Tablet by mouth 2 times a day for 7 days.  Dispense: 14 Tablet; Refill: 0    2. Mouth ulcer  - valacyclovir (VALTREX) 1 GM Tab; Take 1 Tablet by " mouth 2 times a day for 7 days.  Dispense: 14 Tablet; Refill: 0              Lesions are consistent with HSV at this time recent sexual encounter not only was with oral but genital interactions most likely both rashes are consistent with HSV.  We will start the patient on the above at this time however encourage patient to continue to monitor symptoms.  She declined further testing at [facility]. this is reasonable at this time is clinically patient is with HSV.  Appropriate PPE worn at all times by provider.   Pt. Had face mask on throughout entirety of the visit other than oropharyngeal examination today.     Side effects of OTC or prescribed medications discussed.     DDX, Supportive care, and indications for immediate follow-up discussed with patient.    Instructed to return to clinic or nearest emergency department if we are not available for any change in condition, further concerns, or worsening of symptoms.    The patient and/or guardian demonstrated a good understanding and agreed with the treatment plan.    Please note that this dictation was created using voice recognition software. I have made every reasonable attempt to correct obvious errors, but I expect that there are errors of grammar and possibly content that I did not discover before finalizing the note.

## 2022-05-31 DIAGNOSIS — Z30.41 ENCOUNTER FOR SURVEILLANCE OF CONTRACEPTIVE PILLS: ICD-10-CM

## 2022-08-23 DIAGNOSIS — Z30.41 ENCOUNTER FOR SURVEILLANCE OF CONTRACEPTIVE PILLS: ICD-10-CM

## 2022-11-09 ENCOUNTER — OFFICE VISIT (OUTPATIENT)
Dept: MEDICAL GROUP | Facility: PHYSICIAN GROUP | Age: 22
End: 2022-11-09
Payer: COMMERCIAL

## 2022-11-09 VITALS
SYSTOLIC BLOOD PRESSURE: 144 MMHG | DIASTOLIC BLOOD PRESSURE: 70 MMHG | HEIGHT: 66 IN | TEMPERATURE: 97.9 F | BODY MASS INDEX: 22.4 KG/M2 | WEIGHT: 139.4 LBS | HEART RATE: 98 BPM | OXYGEN SATURATION: 97 %

## 2022-11-09 DIAGNOSIS — Z30.41 ENCOUNTER FOR SURVEILLANCE OF CONTRACEPTIVE PILLS: ICD-10-CM

## 2022-11-09 DIAGNOSIS — A60.9 HSV (HERPES SIMPLEX VIRUS) ANOGENITAL INFECTION: ICD-10-CM

## 2022-11-09 DIAGNOSIS — F41.1 GENERALIZED ANXIETY DISORDER: ICD-10-CM

## 2022-11-09 PROCEDURE — 99214 OFFICE O/P EST MOD 30 MIN: CPT

## 2022-11-09 RX ORDER — VALACYCLOVIR HYDROCHLORIDE 1 G/1
1000 TABLET, FILM COATED ORAL 2 TIMES DAILY
Qty: 20 TABLET | Refills: 0 | Status: SHIPPED | OUTPATIENT
Start: 2022-11-09 | End: 2023-11-26 | Stop reason: SDUPTHER

## 2022-11-09 ASSESSMENT — ANXIETY QUESTIONNAIRES
4. TROUBLE RELAXING: NEARLY EVERY DAY
5. BEING SO RESTLESS THAT IT IS HARD TO SIT STILL: NEARLY EVERY DAY
7. FEELING AFRAID AS IF SOMETHING AWFUL MIGHT HAPPEN: SEVERAL DAYS
GAD7 TOTAL SCORE: 16
2. NOT BEING ABLE TO STOP OR CONTROL WORRYING: MORE THAN HALF THE DAYS
1. FEELING NERVOUS, ANXIOUS, OR ON EDGE: NEARLY EVERY DAY
6. BECOMING EASILY ANNOYED OR IRRITABLE: SEVERAL DAYS
3. WORRYING TOO MUCH ABOUT DIFFERENT THINGS: NEARLY EVERY DAY

## 2022-11-09 ASSESSMENT — ENCOUNTER SYMPTOMS
DEPRESSION: 0
NERVOUS/ANXIOUS: 1
DIAPHORESIS: 1
TREMORS: 1
INSOMNIA: 0

## 2022-11-09 ASSESSMENT — LIFESTYLE VARIABLES: SUBSTANCE_ABUSE: 0

## 2022-11-09 ASSESSMENT — PATIENT HEALTH QUESTIONNAIRE - PHQ9: CLINICAL INTERPRETATION OF PHQ2 SCORE: 0

## 2022-11-09 NOTE — PROGRESS NOTES
"Subjective:     CC: Anxiety and birth control refill     HPI:   Flori presents today with    Problem   Generalized Anxiety Disorder    Chronic condition uncontrolled  - JERRY-7 today in clinic is 17  - Patient has tried Zoloft in the past which she did find helpful, without reported side effects.  She is also tried Xanax-this medication made her feel dizzy and tired and she did pass out once in class  -She is having several panic attacks per week.  Patient feels tremulous, restless, and diaphoretic during these events.  -Denies suicidal ideations     Hsv (Herpes Simplex Virus) Anogenital Infection    Patient had a first outbreak of this condition January 2022.  She went to urgent care and was given valacyclovir which worked very well for her.  No reported side effects.  Patient had another recent outbreak and is still having residual symptoms.  She describes lesions as crusting and painful.  She would like refill for acyclovir.  She would also like confirmation that she in fact does have HSV.     Encounter for Surveillance of Contraceptive Pills    Patient currently taking Sprintec 0.25-25 mg/mcg tablet daily.  Doing well on this medication without reported side effects.  No history of blood clots or breast cancer.  She would like refill on this medication.         Health Maintenance: declined    ROS:  Review of Systems   Constitutional:  Positive for diaphoresis (w/ panic attacks).   Neurological:  Positive for tremors (with panic attacks).   Psychiatric/Behavioral:  Negative for depression, substance abuse and suicidal ideas. The patient is nervous/anxious. The patient does not have insomnia.      Objective:     Exam:  BP (!) 144/70 (BP Location: Left arm, Patient Position: Sitting, BP Cuff Size: Adult)   Pulse 98   Temp 36.6 °C (97.9 °F) (Temporal)   Ht 1.676 m (5' 6\")   Wt 63.2 kg (139 lb 6.4 oz)   SpO2 97%   BMI 22.50 kg/m²  Body mass index is 22.5 kg/m².    Physical Exam  Constitutional:       General: " She is not in acute distress.     Appearance: Normal appearance. She is not ill-appearing or toxic-appearing.   HENT:      Head: Normocephalic.   Eyes:      Conjunctiva/sclera: Conjunctivae normal.   Neck:      Comments: No evidence of thyromegaly or goiter  Cardiovascular:      Rate and Rhythm: Normal rate and regular rhythm.      Pulses: Normal pulses.      Heart sounds: Normal heart sounds. No murmur heard.  Pulmonary:      Effort: Pulmonary effort is normal. No respiratory distress.      Breath sounds: Normal breath sounds.   Musculoskeletal:      Cervical back: Normal range of motion and neck supple.   Skin:     General: Skin is warm and dry.   Neurological:      General: No focal deficit present.      Mental Status: She is alert and oriented to person, place, and time.   Psychiatric:         Mood and Affect: Mood normal.         Behavior: Behavior normal.       Labs: none    Assessment & Plan: Medical Decision Making     21 y.o. female with the following -     Problem List Items Addressed This Visit       Encounter for surveillance of contraceptive pills    Relevant Medications    SPRINTEC 28 0.25-35 MG-MCG per tablet    Generalized anxiety disorder     - Chronic uncontrolled  - Reinstating Zoloft prescription for 50 mg daily  -Denies any symptoms of depression or SI/HI today.  Therefore, we will continue the current regimen.  Patient Education:    Discussed the following:   Concept of biochemical imbalance of neurotransmitters and rationale for treatment with daily medicine  Improved therapeutic response with counseling and medicine combined compared to either alone.  Rx dose and dosing schedule common side effects.    Lack of immediate symptom relief and need to keep taking medicine .   Relapse or recurrence of symptoms if therapeutic course of treatment not completed.    SSRI Side Effects and Black Box Warnings reviewed with patient.   Side Effects: medicine may cause headaches, nausea, diarrhea, insomnia,  sexual side effects, irritability,  Black Box Warning: medicine may rarely cause worsening mood, depression, suicidal ideation in children and young adults. If worsening depression, anxiety or other worrisome symptoms occur, contact PCP immediately,   and consider calling the National Suicide Prevention Lifeline (1759.166.3220)   or 631, or transporting patient to the emergency department for immediate evaluation.            Relevant Medications    sertraline (ZOLOFT) 50 MG Tab    Other Relevant Orders    Comp Metabolic Panel    TSH WITH REFLEX TO FT4    HSV (herpes simplex virus) anogenital infection     Chronic condition with current outbreak  - Refill sent for valacyclovir 1000 mg twice daily as needed outbreak         Relevant Medications    valacyclovir (VALTREX) 1 GM Tab    Other Relevant Orders    HSV 1/2 IGG W/ TYPE SPECIFIC RFLX       Differential diagnosis, natural history, supportive care, and indications for immediate follow-up discussed.  Shared decision making approach utilized, and patient is amendable with plan of care.  Patient understands to return to clinic or go to the emergency department if symptoms worsen. All questions and concerns addressed.    Return in about 6 weeks (around 12/21/2022) for anxiety.    Please note that this dictation was created using voice recognition software. I have made every reasonable attempt to correct obvious errors, but I expect that there are errors of grammar and possibly content that I did not discover before finalizing the note.

## 2022-11-09 NOTE — ASSESSMENT & PLAN NOTE
- Chronic uncontrolled  - Reinstating Zoloft prescription for 50 mg daily  -Denies any symptoms of depression or SI/HI today.  Therefore, we will continue the current regimen.  Patient Education:    Discussed the following:   Concept of biochemical imbalance of neurotransmitters and rationale for treatment with daily medicine  Improved therapeutic response with counseling and medicine combined compared to either alone.  Rx dose and dosing schedule common side effects.    Lack of immediate symptom relief and need to keep taking medicine .   Relapse or recurrence of symptoms if therapeutic course of treatment not completed.    SSRI Side Effects and Black Box Warnings reviewed with patient.   Side Effects: medicine may cause headaches, nausea, diarrhea, insomnia, sexual side effects, irritability,  Black Box Warning: medicine may rarely cause worsening mood, depression, suicidal ideation in children and young adults. If worsening depression, anxiety or other worrisome symptoms occur, contact PCP immediately,   and consider calling the National Suicide Prevention Lifeline (1719.247.8508)   or 111, or transporting patient to the emergency department for immediate evaluation.

## 2022-11-10 NOTE — ASSESSMENT & PLAN NOTE
Chronic condition with current outbreak  - Refill sent for valacyclovir 1000 mg twice daily as needed outbreak

## 2022-11-30 ENCOUNTER — HOSPITAL ENCOUNTER (OUTPATIENT)
Dept: LAB | Facility: MEDICAL CENTER | Age: 22
End: 2022-11-30
Payer: COMMERCIAL

## 2022-11-30 DIAGNOSIS — A60.9 HSV (HERPES SIMPLEX VIRUS) ANOGENITAL INFECTION: ICD-10-CM

## 2022-11-30 DIAGNOSIS — F41.1 GENERALIZED ANXIETY DISORDER: ICD-10-CM

## 2022-11-30 LAB
ALBUMIN SERPL BCP-MCNC: 4.5 G/DL (ref 3.2–4.9)
ALBUMIN/GLOB SERPL: 1.7 G/DL
ALP SERPL-CCNC: 64 U/L (ref 30–99)
ALT SERPL-CCNC: 14 U/L (ref 2–50)
ANION GAP SERPL CALC-SCNC: 10 MMOL/L (ref 7–16)
AST SERPL-CCNC: 13 U/L (ref 12–45)
BILIRUB SERPL-MCNC: 0.6 MG/DL (ref 0.1–1.5)
BUN SERPL-MCNC: 9 MG/DL (ref 8–22)
CALCIUM SERPL-MCNC: 9.6 MG/DL (ref 8.5–10.5)
CHLORIDE SERPL-SCNC: 104 MMOL/L (ref 96–112)
CO2 SERPL-SCNC: 26 MMOL/L (ref 20–33)
CREAT SERPL-MCNC: 0.68 MG/DL (ref 0.5–1.4)
FASTING STATUS PATIENT QL REPORTED: NORMAL
GFR SERPLBLD CREATININE-BSD FMLA CKD-EPI: 125 ML/MIN/1.73 M 2
GLOBULIN SER CALC-MCNC: 2.6 G/DL (ref 1.9–3.5)
GLUCOSE SERPL-MCNC: 102 MG/DL (ref 65–99)
POTASSIUM SERPL-SCNC: 4.4 MMOL/L (ref 3.6–5.5)
PROT SERPL-MCNC: 7.1 G/DL (ref 6–8.2)
SODIUM SERPL-SCNC: 140 MMOL/L (ref 135–145)
TSH SERPL DL<=0.005 MIU/L-ACNC: 1.14 UIU/ML (ref 0.38–5.33)

## 2022-11-30 PROCEDURE — 36415 COLL VENOUS BLD VENIPUNCTURE: CPT

## 2022-11-30 PROCEDURE — 86694 HERPES SIMPLEX NES ANTBDY: CPT

## 2022-11-30 PROCEDURE — 80053 COMPREHEN METABOLIC PANEL: CPT

## 2022-11-30 PROCEDURE — 86695 HERPES SIMPLEX TYPE 1 TEST: CPT

## 2022-11-30 PROCEDURE — 84443 ASSAY THYROID STIM HORMONE: CPT

## 2022-11-30 PROCEDURE — 86696 HERPES SIMPLEX TYPE 2 TEST: CPT

## 2022-12-01 LAB
HSV1 GG IGG SER-ACNC: 47.1 IV
HSV1+2 IGG SER IA-ACNC: >22.4 IV
HSV2 GG IGG SER-ACNC: 0.5 IV

## 2023-05-21 ENCOUNTER — OFFICE VISIT (OUTPATIENT)
Dept: URGENT CARE | Facility: CLINIC | Age: 23
End: 2023-05-21
Payer: COMMERCIAL

## 2023-05-21 VITALS
SYSTOLIC BLOOD PRESSURE: 130 MMHG | WEIGHT: 130 LBS | HEIGHT: 65 IN | DIASTOLIC BLOOD PRESSURE: 80 MMHG | RESPIRATION RATE: 20 BRPM | HEART RATE: 86 BPM | BODY MASS INDEX: 21.66 KG/M2 | OXYGEN SATURATION: 96 % | TEMPERATURE: 97.3 F

## 2023-05-21 DIAGNOSIS — H65.00 NON-RECURRENT ACUTE SEROUS OTITIS MEDIA, UNSPECIFIED LATERALITY: ICD-10-CM

## 2023-05-21 DIAGNOSIS — H60.503 ACUTE OTITIS EXTERNA OF BOTH EARS, UNSPECIFIED TYPE: ICD-10-CM

## 2023-05-21 PROCEDURE — 3075F SYST BP GE 130 - 139MM HG: CPT | Performed by: PHYSICIAN ASSISTANT

## 2023-05-21 PROCEDURE — 3079F DIAST BP 80-89 MM HG: CPT | Performed by: PHYSICIAN ASSISTANT

## 2023-05-21 PROCEDURE — 99213 OFFICE O/P EST LOW 20 MIN: CPT | Performed by: PHYSICIAN ASSISTANT

## 2023-05-21 RX ORDER — AMOXICILLIN 875 MG/1
875 TABLET, COATED ORAL 2 TIMES DAILY
Qty: 14 TABLET | Refills: 0 | Status: SHIPPED | OUTPATIENT
Start: 2023-05-21 | End: 2023-05-28

## 2023-05-21 RX ORDER — NEOMYCIN SULFATE, POLYMYXIN B SULFATE AND HYDROCORTISONE 10; 3.5; 1 MG/ML; MG/ML; [USP'U]/ML
4 SUSPENSION/ DROPS AURICULAR (OTIC) 3 TIMES DAILY
Qty: 10 ML | Refills: 0 | Status: SHIPPED | OUTPATIENT
Start: 2023-05-21 | End: 2023-05-28

## 2023-05-21 ASSESSMENT — ENCOUNTER SYMPTOMS
HEADACHES: 0
RHINORRHEA: 0
VOMITING: 0
SORE THROAT: 0
FEVER: 0
CHILLS: 0
MYALGIAS: 0
DIARRHEA: 0
SHORTNESS OF BREATH: 0
NAUSEA: 0
DIZZINESS: 0
COUGH: 0

## 2023-05-21 ASSESSMENT — FIBROSIS 4 INDEX: FIB4 SCORE: 0.11

## 2023-05-21 NOTE — PROGRESS NOTES
"Subjective     Flori Rosario is a 22 y.o. female who presents with Otalgia (Both ears itching, worse in the left ear, started Wednesday )            Otalgia   There is pain in both ears. This is a new problem. Episode onset: 5 days. The problem occurs constantly. The problem has been unchanged. There has been no fever. The pain is moderate. Associated symptoms include hearing loss (muffled hearing). Pertinent negatives include no coughing, diarrhea, ear discharge, headaches, rash, rhinorrhea, sore throat or vomiting. She has tried nothing for the symptoms.       Past Medical History:   Diagnosis Date    Anxiety     Depression     Mononucleosis        No past surgical history on file.      Family History   Problem Relation Age of Onset    Hypertension Mother     Psychiatric Illness Mother         anxiety         Patient has no known allergies.      Medications, Allergies, and current problem list reviewed today in Epic      Review of Systems   Constitutional:  Negative for chills, fever and malaise/fatigue.   HENT:  Positive for ear pain and hearing loss (muffled hearing). Negative for ear discharge, rhinorrhea and sore throat.    Respiratory:  Negative for cough and shortness of breath.    Gastrointestinal:  Negative for diarrhea, nausea and vomiting.   Musculoskeletal:  Negative for myalgias.   Skin:  Negative for rash.   Neurological:  Negative for dizziness and headaches.     All other systems reviewed and are negative.            Objective     /80   Pulse 86   Temp 36.3 °C (97.3 °F) (Temporal)   Resp 20   Ht 1.651 m (5' 5\")   Wt 59 kg (130 lb)   SpO2 96%   BMI 21.63 kg/m²      Physical Exam  Constitutional:       General: She is not in acute distress.     Appearance: She is not ill-appearing.   HENT:      Head: Normocephalic and atraumatic.      Right Ear: External ear normal. Swelling present. A middle ear effusion is present. Tympanic membrane is injected.      Left Ear: External ear " normal. Swelling present. A middle ear effusion is present. Tympanic membrane is injected.      Ears:      Comments: Bilateral canal edema and erythema   Eyes:      Conjunctiva/sclera: Conjunctivae normal.   Cardiovascular:      Rate and Rhythm: Normal rate and regular rhythm.   Pulmonary:      Effort: Pulmonary effort is normal. No respiratory distress.      Breath sounds: No stridor. No wheezing.   Skin:     General: Skin is warm and dry.   Neurological:      General: No focal deficit present.      Mental Status: She is alert and oriented to person, place, and time.   Psychiatric:         Mood and Affect: Mood normal.         Behavior: Behavior normal.         Thought Content: Thought content normal.         Judgment: Judgment normal.                             Assessment & Plan        1. Non-recurrent acute serous otitis media, unspecified laterality    - amoxicillin (AMOXIL) 875 MG tablet; Take 1 Tablet by mouth 2 times a day for 7 days.  Dispense: 14 Tablet; Refill: 0    2. Acute otitis externa of both ears, unspecified type    - neomycin-polymyxin-HC (PEDIOTIC HC) 3.5-52629-1 Suspension; Administer 4 Drops into affected ear(s) 3 times a day for 7 days.  Dispense: 10 mL; Refill: 0     Differential diagnoses, Supportive care, and indications for immediate follow-up discussed with patient.   Pathogenesis of diagnosis discussed including typical length and natural progression.   Instructed to return to clinic or nearest emergency department for any change in condition, further concerns, or worsening of symptoms.        The patient demonstrated a good understanding and agreed with the treatment plan.      Bridget D eLa Cruz P.A.-C.

## 2023-10-20 ENCOUNTER — OFFICE VISIT (OUTPATIENT)
Dept: MEDICAL GROUP | Facility: PHYSICIAN GROUP | Age: 23
End: 2023-10-20
Payer: COMMERCIAL

## 2023-10-20 VITALS
HEIGHT: 65 IN | HEART RATE: 90 BPM | OXYGEN SATURATION: 99 % | BODY MASS INDEX: 25.39 KG/M2 | DIASTOLIC BLOOD PRESSURE: 80 MMHG | TEMPERATURE: 97.4 F | SYSTOLIC BLOOD PRESSURE: 120 MMHG | WEIGHT: 152.4 LBS

## 2023-10-20 DIAGNOSIS — Z00.00 WELLNESS EXAMINATION: ICD-10-CM

## 2023-10-20 DIAGNOSIS — Z30.41 ENCOUNTER FOR SURVEILLANCE OF CONTRACEPTIVE PILLS: ICD-10-CM

## 2023-10-20 DIAGNOSIS — F41.1 GENERALIZED ANXIETY DISORDER: ICD-10-CM

## 2023-10-20 PROCEDURE — 3079F DIAST BP 80-89 MM HG: CPT

## 2023-10-20 PROCEDURE — 3074F SYST BP LT 130 MM HG: CPT

## 2023-10-20 PROCEDURE — 99395 PREV VISIT EST AGE 18-39: CPT

## 2023-10-20 ASSESSMENT — LIFESTYLE VARIABLES
HAVE YOU EVER GOTTEN IN TROUBLE WHILE YOU WERE USING ALCOHOL OR DRUGS: NO
DURING THE PAST 12 MONTHS, ON HOW MANY DAYS DID YOU USE ANY TOBACCO OR NICOTINE PRODUCTS: 350
DURING THE PAST 12 MONTHS, ON HOW MANY DAYS DID YOU USE ANY MARIJUANA: 20
DO YOU EVER USE ALCOHOL OR DRUGS TO RELAX, FEEL BETTER ABOUT YOURSELF, OR FIT IN: YES
DO YOU EVER FORGET THINGS YOU DID WHILE USING ALCOHOL OR DRUGS: YES
DURING THE PAST 12 MONTHS, ON HOW MANY DAYS DID YOU DRINK MORE THAN A FEW SIPS OF BEER, WINE, OR ANY DRINK CONTAINING ALCOHOL: 150
HAVE YOU EVER RIDDEN IN A CAR DRIVEN BY SOMEONE WHO WAS HIGH OR HAD BEEN USING ALCOHOL OR DRUGS: NO
DO YOUR FAMILY OR FRIENDS EVER TELL YOU THAT YOU SHOULD CUT DOWN ON YOUR DRINKING OR DRUG USE: YES
DO YOU EVER USE ALCOHOL OR DRUGS WHILE YOU ARE BY YOURSELF ALONE: NO
DURING THE PAST 12 MONTHS, ON HOW MANY DAYS DID YOU USE ANYTHING ELSE TO GET HIGH: 0
PART A TOTAL SCORE: 520

## 2023-10-20 ASSESSMENT — PATIENT HEALTH QUESTIONNAIRE - PHQ9: CLINICAL INTERPRETATION OF PHQ2 SCORE: 0

## 2023-10-20 ASSESSMENT — FIBROSIS 4 INDEX: FIB4 SCORE: 0.11

## 2023-10-20 NOTE — PROGRESS NOTES
Subjective:     CC:   Chief Complaint   Patient presents with    Medication Refill     SPrintec, valtrex    Annual Exam     NO PAP    Ear Fullness       HPI:   Flori Rosario is a 22 y.o. female who presents for annual exam.  She declines pap smear and STI screen at this time. She has no concerns. She needs refill on her medications.     Patient has GYN provider: No   Last Pap Smear: Has not had one   H/O Abnormal Pap: No  Last Mammogram: N/A  Last Bone Density Test: N/A  Last Colorectal Cancer Screening: N/A  Last Tdap: UTD  Received HPV series: Patient does not recall    Exercise: moderate regular exercise, aerobic < 3 days a week  Diet: Healthy      No LMP recorded.  Hx STDs: Yes, HSV 1  Birth control: Printec, ocp  Menses every month with 6 days with moderate bleeding.  Denies cramping.  No significant bloating/fluid retention, pelvic pain, or dyspareunia. No abnormal vaginal discharge.  No breast tenderness, mass, nipple discharge, changes in size or contour, or abnormal cyclic discomfort.    OB History    Para Term  AB Living   0 0 0 0 0 0   SAB IAB Ectopic Molar Multiple Live Births   0 0 0 0 0 0      She  reports that she is not currently sexually active. She reports using the following method of birth control/protection: Pill.    She  has a past medical history of Anxiety, Depression, and Mononucleosis.    She has no past medical history of Allergy, GERD (gastroesophageal reflux disease), Ulcer, or Urinary tract infection, site not specified.  She  has no past surgical history on file.    Family History   Problem Relation Age of Onset    Hypertension Mother     Psychiatric Illness Mother         anxiety     Social History     Tobacco Use    Smoking status: Former     Current packs/day: 0.00     Types: Cigarettes     Start date: 2016     Quit date: 2018     Years since quittin.4    Smokeless tobacco: Never    Tobacco comments:     one a day    Vaping Use    Vaping Use:  "Every day   Substance Use Topics    Alcohol use: No    Drug use: No       Patient Active Problem List    Diagnosis Date Noted    Generalized anxiety disorder 11/09/2022    HSV (herpes simplex virus) anogenital infection 11/09/2022    Encounter for surveillance of contraceptive pills 06/14/2021    Grade III hemorrhoids 03/05/2019    Changing nevus 11/12/2018    Chronic ear pain, bilateral 06/11/2018     Current Outpatient Medications   Medication Sig Dispense Refill    SPRINTEC 28 0.25-35 MG-MCG per tablet Take 1 Tablet by mouth every day. 84 Tablet 3    sertraline (ZOLOFT) 50 MG Tab Take 1 Tablet by mouth every day. 30 Tablet 11    valacyclovir (VALTREX) 1 GM Tab Take 1 Tablet by mouth 2 times a day. 20 Tablet 0     No current facility-administered medications for this visit.     No Known Allergies    Review of Systems   Constitutional: Negative for fever, chills and malaise/fatigue.   HENT: Negative for congestion.    Eyes: Negative for pain.   Respiratory: Negative for cough and shortness of breath.    Cardiovascular: Negative for chest pain and leg swelling.   Gastrointestinal: Negative for nausea, vomiting, abdominal pain and diarrhea.   Genitourinary: Negative for dysuria and hematuria.   Skin: Negative for rash.   Neurological: Negative for dizziness, focal weakness and headaches.   Endo/Heme/Allergies: Does not bruise/bleed easily.   Psychiatric/Behavioral: Negative for depression.  The patient is not nervous/anxious.      Objective:   /80 (BP Location: Left arm, Patient Position: Sitting, BP Cuff Size: Adult)   Pulse 90   Temp 36.3 °C (97.4 °F) (Temporal)   Ht 1.651 m (5' 5\")   Wt 69.1 kg (152 lb 6.4 oz)   SpO2 99%   BMI 25.36 kg/m²     Wt Readings from Last 4 Encounters:   10/20/23 69.1 kg (152 lb 6.4 oz)   05/21/23 59 kg (130 lb)   11/09/22 63.2 kg (139 lb 6.4 oz)   01/31/22 61.2 kg (135 lb)     Physical Exam:  Constitutional: Well-developed and well-nourished. Not diaphoretic. No distress. "   Skin: Skin is warm and dry. No rash noted.  Head: Atraumatic without lesions.  Eyes: Conjunctivae and extraocular motions are normal. Pupils are equal, round, and reactive to light. No scleral icterus.   Ears:  External ears unremarkable. Tympanic membranes clear and intact.  Nose: Nares patent. Septum midline. Turbinates without erythema nor edema. No discharge.   Mouth/Throat: Tongue normal. Oropharynx is clear and moist. Posterior pharynx without erythema or exudates.  Neck: Supple, trachea midline. Normal range of motion. No thyromegaly present. No lymphadenopathy--cervical or supraclavicular.  Cardiovascular: Regular rate and rhythm, S1 and S2 without murmur, rubs, or gallops.    Respiratory: Effort normal. Clear to auscultation throughout. No adventitious sounds.   Abdomen: Soft, non tender, and without distention. Active bowel sounds in all four quadrants. No rebound, guarding, masses or HSM.  Extremities: No cyanosis, clubbing, erythema, nor edema. Distal pulses intact and symmetric.   Musculoskeletal: All major joints AROM full in all directions without pain.  Neurological: Alert and oriented x 3. Grossly non-focal. Strength and sensation grossly intact. DTRs 2+/3 and symmetric.   Psychiatric:  Behavior, mood, and affect are appropriate.    Assessment and Plan:     1. Wellness examination  - Comp Metabolic Panel; Future    2. Encounter for surveillance of contraceptive pills  - SPRINTEC 28 0.25-35 MG-MCG per tablet; Take 1 Tablet by mouth every day.  Dispense: 84 Tablet; Refill: 3    3. Generalized anxiety disorder  - sertraline (ZOLOFT) 50 MG Tab; Take 1 Tablet by mouth every day.  Dispense: 30 Tablet; Refill: 11      Health maintenance:  declines   Labs per orders  Immunizations per orders  Patient counseled about skin care, diet, supplements, and exercise.  Discussed  breast self exam, mammography screening, feminine hygiene, use and side effects of OCP's, family planning choices, adequate intake of  calcium and vitamin D, diet and exercise, colorectal cancer screening     Follow-up: Return in about 1 year (around 10/20/2024).

## 2023-11-26 DIAGNOSIS — A60.9 HSV (HERPES SIMPLEX VIRUS) ANOGENITAL INFECTION: ICD-10-CM

## 2023-11-27 RX ORDER — VALACYCLOVIR HYDROCHLORIDE 1 G/1
1000 TABLET, FILM COATED ORAL 2 TIMES DAILY
Qty: 20 TABLET | Refills: 0 | Status: SHIPPED | OUTPATIENT
Start: 2023-11-27

## 2024-03-06 ENCOUNTER — APPOINTMENT (OUTPATIENT)
Dept: MEDICAL GROUP | Facility: PHYSICIAN GROUP | Age: 24
End: 2024-03-06
Payer: COMMERCIAL

## 2024-06-27 ENCOUNTER — OFFICE VISIT (OUTPATIENT)
Dept: URGENT CARE | Facility: CLINIC | Age: 24
End: 2024-06-27
Payer: COMMERCIAL

## 2024-06-27 VITALS
HEIGHT: 65 IN | TEMPERATURE: 98.2 F | WEIGHT: 168.4 LBS | SYSTOLIC BLOOD PRESSURE: 128 MMHG | BODY MASS INDEX: 28.06 KG/M2 | RESPIRATION RATE: 16 BRPM | DIASTOLIC BLOOD PRESSURE: 72 MMHG | HEART RATE: 81 BPM | OXYGEN SATURATION: 95 %

## 2024-06-27 DIAGNOSIS — H60.333 ACUTE SWIMMER'S EAR OF BOTH SIDES: ICD-10-CM

## 2024-06-27 PROCEDURE — 99213 OFFICE O/P EST LOW 20 MIN: CPT | Performed by: STUDENT IN AN ORGANIZED HEALTH CARE EDUCATION/TRAINING PROGRAM

## 2024-06-27 PROCEDURE — 3078F DIAST BP <80 MM HG: CPT | Performed by: STUDENT IN AN ORGANIZED HEALTH CARE EDUCATION/TRAINING PROGRAM

## 2024-06-27 PROCEDURE — 3074F SYST BP LT 130 MM HG: CPT | Performed by: STUDENT IN AN ORGANIZED HEALTH CARE EDUCATION/TRAINING PROGRAM

## 2024-06-27 RX ORDER — NEOMYCIN SULFATE, POLYMYXIN B SULFATE AND HYDROCORTISONE 10; 3.5; 1 MG/ML; MG/ML; [USP'U]/ML
4 SUSPENSION/ DROPS AURICULAR (OTIC) 4 TIMES DAILY
Qty: 16 ML | Refills: 0 | Status: SHIPPED | OUTPATIENT
Start: 2024-06-27 | End: 2024-07-07

## 2024-06-27 ASSESSMENT — ENCOUNTER SYMPTOMS
COUGH: 0
SHORTNESS OF BREATH: 0
CHILLS: 0
FEVER: 0
SORE THROAT: 0
WHEEZING: 0

## 2024-06-27 ASSESSMENT — FIBROSIS 4 INDEX: FIB4 SCORE: 0.11

## 2024-06-27 NOTE — PROGRESS NOTES
"Subjective     Flori Rosario is a 23 y.o. female who presents with Otalgia (X3 days On both ears is feeling pain mostly on the right, believes is an ear infection.)            Flori is a 23 y.o. female who presents to urgent care with bilateral ear pain.  Patient states right ear is worse than her left ear but she is experiencing pain in both ears.  Patient recently returned from vacation in the [place]. Patient has history of otitis externa. No fever/chills. No URI-like symptoms.        Review of Systems   Constitutional:  Negative for chills, fever and malaise/fatigue.   HENT:  Positive for ear pain. Negative for congestion and sore throat.    Respiratory:  Negative for cough, shortness of breath and wheezing.    All other systems reviewed and are negative.             Objective     /72   Pulse 81   Temp 36.8 °C (98.2 °F) (Temporal)   Resp 16   Ht 1.651 m (5' 5\")   Wt 76.4 kg (168 lb 6.4 oz)   SpO2 95%   BMI 28.02 kg/m²      Physical Exam  Vitals reviewed.   Constitutional:       General: She is not in acute distress.     Appearance: Normal appearance. She is not toxic-appearing.   HENT:      Head: Normocephalic and atraumatic.      Right Ear: Tympanic membrane and external ear normal. Swelling present.      Left Ear: Tympanic membrane and external ear normal. Swelling present.      Ears:      Comments: Swelling ad erythema of bilateral EACs. Pain with movement of pinna.     Nose: Nose normal.   Eyes:      Extraocular Movements: Extraocular movements intact.      Conjunctiva/sclera: Conjunctivae normal.      Pupils: Pupils are equal, round, and reactive to light.   Cardiovascular:      Rate and Rhythm: Normal rate.   Pulmonary:      Effort: Pulmonary effort is normal.   Skin:     General: Skin is warm and dry.   Neurological:      General: No focal deficit present.      Mental Status: She is alert.                             Assessment & Plan        1. Acute swimmer's ear of both " sides  - neomycin-polymyxin-HC (PEDIOTIC HC) 3.5-28313-0 Suspension; Administer 4 Drops into affected ear(s) 4 times a day for 10 days.  Dispense: 16 mL; Refill: 0     Ear should be protected from water during treatment.  Should refrain from water sports for 7 to 10 days.     Differential diagnoses, supportive care, and indications for immediate follow-up discussed with patient. Pathogenesis of diagnosis discussed including typical length and natural progression.  See AVS.     Instructed to return to urgent care or nearest emergency department if symptoms fail to improve, for any change in condition, further concerns, or new concerning symptoms.    Patient states understanding and agrees with the plan of care and discharge instructions.

## 2024-07-08 ENCOUNTER — OFFICE VISIT (OUTPATIENT)
Dept: URGENT CARE | Facility: CLINIC | Age: 24
End: 2024-07-08
Payer: COMMERCIAL

## 2024-07-08 VITALS
TEMPERATURE: 98.9 F | BODY MASS INDEX: 28.17 KG/M2 | WEIGHT: 165 LBS | HEIGHT: 64 IN | RESPIRATION RATE: 16 BRPM | OXYGEN SATURATION: 98 % | SYSTOLIC BLOOD PRESSURE: 134 MMHG | DIASTOLIC BLOOD PRESSURE: 90 MMHG | HEART RATE: 78 BPM

## 2024-07-08 DIAGNOSIS — A09 TRAVELER'S DIARRHEA: ICD-10-CM

## 2024-07-08 PROCEDURE — 3080F DIAST BP >= 90 MM HG: CPT | Performed by: PHYSICIAN ASSISTANT

## 2024-07-08 PROCEDURE — 3075F SYST BP GE 130 - 139MM HG: CPT | Performed by: PHYSICIAN ASSISTANT

## 2024-07-08 PROCEDURE — 99214 OFFICE O/P EST MOD 30 MIN: CPT | Performed by: PHYSICIAN ASSISTANT

## 2024-07-08 RX ORDER — AZITHROMYCIN 500 MG/1
500 TABLET, FILM COATED ORAL DAILY
Qty: 3 TABLET | Refills: 0 | Status: SHIPPED | OUTPATIENT
Start: 2024-07-08 | End: 2024-07-11

## 2024-07-08 ASSESSMENT — ENCOUNTER SYMPTOMS
CHILLS: 0
HEADACHES: 0
ABDOMINAL PAIN: 1
DIARRHEA: 1
VOMITING: 0
MYALGIAS: 0
BLOATING: 0
SWEATS: 0
COUGH: 0
FLATUS: 0
ARTHRALGIAS: 0
FEVER: 0

## 2024-07-08 ASSESSMENT — FIBROSIS 4 INDEX: FIB4 SCORE: 0.11

## 2024-08-05 ENCOUNTER — APPOINTMENT (OUTPATIENT)
Dept: MEDICAL GROUP | Facility: PHYSICIAN GROUP | Age: 24
End: 2024-08-05
Payer: COMMERCIAL

## 2024-08-05 VITALS
HEIGHT: 64 IN | BODY MASS INDEX: 28.75 KG/M2 | HEART RATE: 98 BPM | DIASTOLIC BLOOD PRESSURE: 80 MMHG | OXYGEN SATURATION: 96 % | TEMPERATURE: 98.6 F | WEIGHT: 168.4 LBS | SYSTOLIC BLOOD PRESSURE: 120 MMHG

## 2024-08-05 DIAGNOSIS — Z00.00 HEALTHCARE MAINTENANCE: ICD-10-CM

## 2024-08-05 DIAGNOSIS — F10.10 ALCOHOL ABUSE: ICD-10-CM

## 2024-08-05 DIAGNOSIS — F41.1 GENERALIZED ANXIETY DISORDER: ICD-10-CM

## 2024-08-05 DIAGNOSIS — A60.9 HSV (HERPES SIMPLEX VIRUS) ANOGENITAL INFECTION: ICD-10-CM

## 2024-08-05 PROCEDURE — 99214 OFFICE O/P EST MOD 30 MIN: CPT

## 2024-08-05 PROCEDURE — 3074F SYST BP LT 130 MM HG: CPT

## 2024-08-05 PROCEDURE — 3079F DIAST BP 80-89 MM HG: CPT

## 2024-08-05 RX ORDER — NALTREXONE HYDROCHLORIDE 50 MG/1
50 TABLET, FILM COATED ORAL DAILY
Qty: 30 TABLET | Refills: 2 | Status: SHIPPED | OUTPATIENT
Start: 2024-08-05

## 2024-08-05 ASSESSMENT — PATIENT HEALTH QUESTIONNAIRE - PHQ9
SUM OF ALL RESPONSES TO PHQ QUESTIONS 1-9: 6
5. POOR APPETITE OR OVEREATING: 0 - NOT AT ALL
CLINICAL INTERPRETATION OF PHQ2 SCORE: 1

## 2024-08-05 ASSESSMENT — ENCOUNTER SYMPTOMS
NAUSEA: 0
HEADACHES: 0
DIZZINESS: 0
DIARRHEA: 0
MYALGIAS: 0
SHORTNESS OF BREATH: 0
ABDOMINAL PAIN: 0
WEIGHT LOSS: 0
VOMITING: 0
COUGH: 0
WEAKNESS: 0
BLURRED VISION: 0
CHILLS: 0
CONSTIPATION: 0
FEVER: 0

## 2024-08-05 ASSESSMENT — FIBROSIS 4 INDEX: FIB4 SCORE: 0.11

## 2024-08-05 NOTE — PROGRESS NOTES
"Verbal consent was acquired by the patient to use [employer] ambient listening note generation during this visit  Subjective:     CC: alcohol addiction     HPI:   Flori presents today with  History of Present Illness  The patient presents for medication refill.    She is seeking initiate naltrexone prescription and requests a comprehensive blood count and liver enzyme tests. In 2011, she had a bout of mononucleosis. She occasionally experiences herpes outbreaks. She has been seeing an alcohol counselor for excessive alcohol consumption, a condition she has been battling for several years. The counselor suggested starting naltrexone as a potential solution. She has never taken naltrexone before. She also wishes to ensure her Sprintec and Zoloft prescriptions have been refilled. A few months ago, she forgot to collect her Zoloft prescription.      Problem   Alcohol Abuse   Generalized Anxiety Disorder   Hsv (Herpes Simplex Virus) Anogenital Infection    Patient had a first outbreak of this condition January 2022.  She went to urgent care and was given valacyclovir which worked very well for her.  No reported side effects.             ROS:  Review of Systems   Constitutional:  Negative for chills, fever, malaise/fatigue and weight loss.   Eyes:  Negative for blurred vision.   Respiratory:  Negative for cough and shortness of breath.    Cardiovascular:  Negative for chest pain.   Gastrointestinal:  Negative for abdominal pain, constipation, diarrhea, nausea and vomiting.   Musculoskeletal:  Negative for myalgias.   Neurological:  Negative for dizziness, weakness and headaches.       Objective:     Exam:  /80 (BP Location: Left arm, Patient Position: Sitting, BP Cuff Size: Adult)   Pulse 98   Temp 37 °C (98.6 °F) (Temporal)   Ht 1.626 m (5' 4\")   Wt 76.4 kg (168 lb 6.4 oz)   SpO2 96%   BMI 28.91 kg/m²  Body mass index is 28.91 kg/m².    Physical Exam  Constitutional:       General: She is not in acute " distress.     Appearance: Normal appearance. She is not ill-appearing or toxic-appearing.   HENT:      Head: Normocephalic.   Eyes:      Conjunctiva/sclera: Conjunctivae normal.   Cardiovascular:      Rate and Rhythm: Normal rate and regular rhythm.      Pulses: Normal pulses.      Heart sounds: Normal heart sounds. No murmur heard.  Pulmonary:      Effort: Pulmonary effort is normal. No respiratory distress.      Breath sounds: Normal breath sounds.   Skin:     General: Skin is warm and dry.   Neurological:      General: No focal deficit present.      Mental Status: She is alert and oriented to person, place, and time.   Psychiatric:         Mood and Affect: Mood normal.         Behavior: Behavior normal.         Labs:   No visits with results within 1 Month(s) from this visit.   Latest known visit with results is:   Hospital Outpatient Visit on 11/30/2022   Component Date Value    Hsv I-Ii Igg Antibodies 11/30/2022 >22.40     TSH 11/30/2022 1.140     Sodium 11/30/2022 140     Potassium 11/30/2022 4.4     Chloride 11/30/2022 104     Co2 11/30/2022 26     Anion Gap 11/30/2022 10.0     Glucose 11/30/2022 102 (H)     Bun 11/30/2022 9     Creatinine 11/30/2022 0.68     Calcium 11/30/2022 9.6     AST(SGOT) 11/30/2022 13     ALT(SGPT) 11/30/2022 14     Alkaline Phosphatase 11/30/2022 64     Total Bilirubin 11/30/2022 0.6     Albumin 11/30/2022 4.5     Total Protein 11/30/2022 7.1     Globulin 11/30/2022 2.6     A-G Ratio 11/30/2022 1.7     Fasting Status 11/30/2022 Fasting     GFR (CKD-EPI) 11/30/2022 125     HSV2 Gly IgG 11/30/2022 0.50     HSV1 Gly IgG 11/30/2022 47.10 (H)        Assessment & Plan: Medical Decision Making     23 y.o. female with the following -   Assessment & Plan  1. Medication refill.  Her AST levels were low in 2022, while her thyroid function was normal. However, she tested positive for antibodies for herpes simplex 1 lip. A Complete Blood Count (CBC) and metabolic panel will be ordered. Naltrexone  50 mg daily will be prescribed, starting with 30 tablets with 2 refills. If beneficial, a 90-day prescription will be issued. She is advised to continue counseling. Her Sprintec and Zoloft prescriptions will be refilled.    Follow-up  She will follow up in 1 year.    Problem List Items Addressed This Visit       Generalized anxiety disorder     - Chronic stable  - Will continue Zoloft prescription for 50 mg daily  -Denies any symptoms of depression or SI/HI today.  Therefore, we will continue the current regimen.  Patient Education:    Discussed the following:   Concept of biochemical imbalance of neurotransmitters and rationale for treatment with daily medicine  Improved therapeutic response with counseling and medicine combined compared to either alone.  Rx dose and dosing schedule common side effects.    Lack of immediate symptom relief and need to keep taking medicine .   Relapse or recurrence of symptoms if therapeutic course of treatment not completed.    SSRI Side Effects and Black Box Warnings reviewed with patient.   Side Effects: medicine may cause headaches, nausea, diarrhea, insomnia, sexual side effects, irritability,  Black Box Warning: medicine may rarely cause worsening mood, depression, suicidal ideation in children and young adults. If worsening depression, anxiety or other worrisome symptoms occur, contact PCP immediately,   and consider calling the National Suicide Prevention Lifeline (1667.764.9866)   or 932, or transporting patient to the emergency department for immediate evaluation.            HSV (herpes simplex virus) anogenital infection     Chronic condition stable  - Will refill valacyclovir 1000 mg twice daily as needed outbreak         Alcohol abuse     Chronic condition uncontrolled  We will initiate naltrexone 50 mg daily as recommended by her alcohol counselor.  She will continue intensive alcohol therapy as she is currently undergoing.  Side effects of this medication discussed at  length.  She will follow-up as needed.         Relevant Medications    naltrexone (DEPADE) 50 MG Tab    Other Relevant Orders    Comp Metabolic Panel    CBC WITHOUT DIFFERENTIAL     Other Visit Diagnoses       Healthcare maintenance        Relevant Orders    Comp Metabolic Panel    CBC WITHOUT DIFFERENTIAL            Differential diagnosis, natural history, supportive care, and indications for immediate follow-up discussed.  Shared decision making approach utilized, and patient is amendable with plan of care.  Patient understands to return to clinic or go to the emergency department if symptoms worsen. All questions and concerns addressed to the best of my knowledge.    Return if symptoms worsen or fail to improve.    Please note that this dictation was created using voice recognition software. I have made every reasonable attempt to correct obvious errors, but I expect that there are errors of grammar and possibly content that I did not discover before finalizing the note.

## 2024-08-05 NOTE — ASSESSMENT & PLAN NOTE
- Chronic stable  - Will continue Zoloft prescription for 50 mg daily  -Denies any symptoms of depression or SI/HI today.  Therefore, we will continue the current regimen.  Patient Education:    Discussed the following:   Concept of biochemical imbalance of neurotransmitters and rationale for treatment with daily medicine  Improved therapeutic response with counseling and medicine combined compared to either alone.  Rx dose and dosing schedule common side effects.    Lack of immediate symptom relief and need to keep taking medicine .   Relapse or recurrence of symptoms if therapeutic course of treatment not completed.    SSRI Side Effects and Black Box Warnings reviewed with patient.   Side Effects: medicine may cause headaches, nausea, diarrhea, insomnia, sexual side effects, irritability,  Black Box Warning: medicine may rarely cause worsening mood, depression, suicidal ideation in children and young adults. If worsening depression, anxiety or other worrisome symptoms occur, contact PCP immediately,   and consider calling the National Suicide Prevention Lifeline (1673.400.8701)   or 967, or transporting patient to the emergency department for immediate evaluation.     
Chronic condition stable  - Will refill valacyclovir 1000 mg twice daily as needed outbreak  
Chronic condition uncontrolled  We will initiate naltrexone 50 mg daily as recommended by her alcohol counselor.  She will continue intensive alcohol therapy as she is currently undergoing.  Side effects of this medication discussed at length.  She will follow-up as needed.  
patient

## 2024-09-23 DIAGNOSIS — Z30.41 ENCOUNTER FOR SURVEILLANCE OF CONTRACEPTIVE PILLS: ICD-10-CM

## 2024-09-23 RX ORDER — NORGESTIMATE AND ETHINYL ESTRADIOL 0.25-0.035
1 KIT ORAL DAILY
Qty: 84 TABLET | Refills: 0 | Status: SHIPPED | OUTPATIENT
Start: 2024-09-23

## 2024-09-23 NOTE — TELEPHONE ENCOUNTER
Received request via: Pharmacy    Was the patient seen in the last year in this department? Yes    Does the patient have an active prescription (recently filled or refills available) for medication(s) requested? No    Pharmacy Name: [facility]    Does the patient have assisted Plus and need 100-day supply? (This applies to ALL medications) Patient does not have SCP   - - -

## 2024-11-13 ENCOUNTER — APPOINTMENT (OUTPATIENT)
Dept: MEDICAL GROUP | Facility: PHYSICIAN GROUP | Age: 24
End: 2024-11-13
Payer: COMMERCIAL

## 2024-12-21 DIAGNOSIS — F41.1 GENERALIZED ANXIETY DISORDER: ICD-10-CM

## 2024-12-23 NOTE — TELEPHONE ENCOUNTER
Received request via: Pharmacy    Was the patient seen in the last year in this department? Yes    Does the patient have an active prescription (recently filled or refills available) for medication(s) requested? No    Pharmacy Name:     Does the patient have senior living Plus and need 100-day supply? (This applies to ALL medications) Patient does not have SCP

## 2025-01-14 ENCOUNTER — HOSPITAL ENCOUNTER (OUTPATIENT)
Dept: LAB | Facility: MEDICAL CENTER | Age: 25
End: 2025-01-14
Attending: NURSE PRACTITIONER
Payer: COMMERCIAL

## 2025-01-14 ENCOUNTER — OFFICE VISIT (OUTPATIENT)
Dept: MEDICAL GROUP | Facility: PHYSICIAN GROUP | Age: 25
End: 2025-01-14
Payer: COMMERCIAL

## 2025-01-14 VITALS
SYSTOLIC BLOOD PRESSURE: 118 MMHG | HEART RATE: 77 BPM | DIASTOLIC BLOOD PRESSURE: 72 MMHG | TEMPERATURE: 98.2 F | HEIGHT: 64 IN | OXYGEN SATURATION: 98 % | BODY MASS INDEX: 28.91 KG/M2

## 2025-01-14 DIAGNOSIS — F41.1 GENERALIZED ANXIETY DISORDER: ICD-10-CM

## 2025-01-14 DIAGNOSIS — Z30.41 ENCOUNTER FOR SURVEILLANCE OF CONTRACEPTIVE PILLS: ICD-10-CM

## 2025-01-14 DIAGNOSIS — F10.10 ALCOHOL ABUSE: ICD-10-CM

## 2025-01-14 DIAGNOSIS — Z00.00 WELLNESS EXAMINATION: ICD-10-CM

## 2025-01-14 LAB
ALBUMIN SERPL BCP-MCNC: 4.2 G/DL (ref 3.2–4.9)
ALBUMIN/GLOB SERPL: 1.4 G/DL
ALP SERPL-CCNC: 81 U/L (ref 30–99)
ALT SERPL-CCNC: 21 U/L (ref 2–50)
ANION GAP SERPL CALC-SCNC: 10 MMOL/L (ref 7–16)
AST SERPL-CCNC: 24 U/L (ref 12–45)
BASOPHILS # BLD AUTO: 0.2 % (ref 0–1.8)
BASOPHILS # BLD: 0.01 K/UL (ref 0–0.12)
BILIRUB SERPL-MCNC: 0.5 MG/DL (ref 0.1–1.5)
BUN SERPL-MCNC: 15 MG/DL (ref 8–22)
CALCIUM ALBUM COR SERPL-MCNC: 8.9 MG/DL (ref 8.5–10.5)
CALCIUM SERPL-MCNC: 9.1 MG/DL (ref 8.5–10.5)
CHLORIDE SERPL-SCNC: 102 MMOL/L (ref 96–112)
CO2 SERPL-SCNC: 24 MMOL/L (ref 20–33)
CREAT SERPL-MCNC: 0.77 MG/DL (ref 0.5–1.4)
EOSINOPHIL # BLD AUTO: 0.1 K/UL (ref 0–0.51)
EOSINOPHIL NFR BLD: 2.4 % (ref 0–6.9)
ERYTHROCYTE [DISTWIDTH] IN BLOOD BY AUTOMATED COUNT: 45 FL (ref 35.9–50)
FASTING STATUS PATIENT QL REPORTED: NORMAL
GFR SERPLBLD CREATININE-BSD FMLA CKD-EPI: 110 ML/MIN/1.73 M 2
GLOBULIN SER CALC-MCNC: 3.1 G/DL (ref 1.9–3.5)
GLUCOSE SERPL-MCNC: 106 MG/DL (ref 65–99)
HCT VFR BLD AUTO: 44.7 % (ref 37–47)
HGB BLD-MCNC: 14.7 G/DL (ref 12–16)
IMM GRANULOCYTES # BLD AUTO: 0.01 K/UL (ref 0–0.11)
IMM GRANULOCYTES NFR BLD AUTO: 0.2 % (ref 0–0.9)
LYMPHOCYTES # BLD AUTO: 1.26 K/UL (ref 1–4.8)
LYMPHOCYTES NFR BLD: 29.6 % (ref 22–41)
MCH RBC QN AUTO: 31.4 PG (ref 27–33)
MCHC RBC AUTO-ENTMCNC: 32.9 G/DL (ref 32.2–35.5)
MCV RBC AUTO: 95.5 FL (ref 81.4–97.8)
MONOCYTES # BLD AUTO: 0.39 K/UL (ref 0–0.85)
MONOCYTES NFR BLD AUTO: 9.2 % (ref 0–13.4)
NEUTROPHILS # BLD AUTO: 2.48 K/UL (ref 1.82–7.42)
NEUTROPHILS NFR BLD: 58.4 % (ref 44–72)
NRBC # BLD AUTO: 0 K/UL
NRBC BLD-RTO: 0 /100 WBC (ref 0–0.2)
PLATELET # BLD AUTO: 359 K/UL (ref 164–446)
PMV BLD AUTO: 10.5 FL (ref 9–12.9)
POTASSIUM SERPL-SCNC: 4.4 MMOL/L (ref 3.6–5.5)
PROT SERPL-MCNC: 7.3 G/DL (ref 6–8.2)
RBC # BLD AUTO: 4.68 M/UL (ref 4.2–5.4)
SODIUM SERPL-SCNC: 136 MMOL/L (ref 135–145)
WBC # BLD AUTO: 4.3 K/UL (ref 4.8–10.8)

## 2025-01-14 PROCEDURE — 85025 COMPLETE CBC W/AUTO DIFF WBC: CPT

## 2025-01-14 PROCEDURE — 36415 COLL VENOUS BLD VENIPUNCTURE: CPT

## 2025-01-14 PROCEDURE — 80053 COMPREHEN METABOLIC PANEL: CPT

## 2025-01-14 PROCEDURE — 3078F DIAST BP <80 MM HG: CPT | Performed by: NURSE PRACTITIONER

## 2025-01-14 PROCEDURE — 3074F SYST BP LT 130 MM HG: CPT | Performed by: NURSE PRACTITIONER

## 2025-01-14 PROCEDURE — 99214 OFFICE O/P EST MOD 30 MIN: CPT | Performed by: NURSE PRACTITIONER

## 2025-01-14 RX ORDER — NALTREXONE HYDROCHLORIDE 50 MG/1
50 TABLET, FILM COATED ORAL DAILY
Qty: 90 TABLET | Refills: 0 | Status: SHIPPED | OUTPATIENT
Start: 2025-01-14

## 2025-01-14 RX ORDER — NORGESTIMATE AND ETHINYL ESTRADIOL 0.25-0.035
1 KIT ORAL DAILY
Qty: 84 TABLET | Refills: 0 | Status: SHIPPED | OUTPATIENT
Start: 2025-01-14

## 2025-01-14 ASSESSMENT — PATIENT HEALTH QUESTIONNAIRE - PHQ9: CLINICAL INTERPRETATION OF PHQ2 SCORE: 0

## 2025-01-14 NOTE — PROGRESS NOTES
Verbal consent was acquired by the patient to use [employer] ambient listening note generation during this visit yes    Subjective:     HPI:   History of Present Illness  The patient is a 24-year-old female who presents today to discuss medication refills.    Alcoholism  She was previously prescribed naltrexone by Jere but discontinued its use during the summer due to running out of the medication. She has not completed any repeat laboratory tests as ordered by [facility]. She reports no current alcohol consumption and expresses a desire to maintain this abstinence. She recalls experiencing mild side effects, including dizziness and decreased appetite, during the initial week of naltrexone therapy, which resolved upon switching to nighttime administration. The medication was effective in managing her cravings. She was on naltrexone for approximately 2 months, during which she remained sober for 3.5 months. However, she began consuming alcohol again and is now seeking to resume naltrexone therapy. Her last alcohol intake was in October 2024.  - Onset: Discontinued during the summer; seeking to resume after last alcohol intake in October 2024.  - Duration: On naltrexone for approximately 2 months; sober for 3.5 months.  - Character: Mild side effects including dizziness and decreased appetite during the initial week.  - Alleviating Factors: Side effects resolved upon switching to nighttime administration.  - Severity: Effective in managing cravings.    Generalized Anxiety Disorder  She has been inconsistent with her sertraline regimen, often forgetting to take the medication. She acknowledges experiencing adverse effects when combining sertraline with alcohol in the past.  - Character: Inconsistent regimen, adverse effects when combined with alcohol.        SOCIAL HISTORY  She reports no current alcohol consumption.    MEDICATIONS  - Current: Sprintec, sertraline  - Discontinued: naltrexone, valacyclovir    Health  "Maintenance: declines    Objective:     Exam:  /72 (BP Location: Left arm, Patient Position: Sitting, BP Cuff Size: Adult)   Pulse 77   Temp 36.8 °C (98.2 °F) (Temporal)   Ht 1.626 m (5' 4\")   SpO2 98%   BMI 28.91 kg/m²  Body mass index is 28.91 kg/m².    Physical Exam  Constitutional:       Appearance: Normal appearance.   HENT:      Head: Normocephalic and atraumatic.   Cardiovascular:      Rate and Rhythm: Normal rate and regular rhythm.      Pulses: Normal pulses.      Heart sounds: Normal heart sounds.   Pulmonary:      Effort: Pulmonary effort is normal.      Breath sounds: Normal breath sounds.   Skin:     General: Skin is warm and dry.      Capillary Refill: Capillary refill takes less than 2 seconds.   Neurological:      General: No focal deficit present.      Mental Status: She is alert and oriented to person, place, and time.         Results      Assessment & Plan:     1. Encounter for surveillance of contraceptive pills  SPRINTEC 28 0.25-35 MG-MCG per tablet      2. Generalized anxiety disorder  sertraline (ZOLOFT) 50 MG Tab      3. Wellness examination  CBC WITH DIFFERENTIAL    Comp Metabolic Panel      4. Alcohol abuse  naltrexone (DEPADE) 50 MG Tab          Assessment & Plan  1.Birth Control - Prescription for Sprintec  - A prescription for Sprintec will be sent to her pharmacy at [facility] on [address].    2. Alcohol use disorder - Restarting naltrexone  - To restart naltrexone, liver enzymes need to be checked.  - A CBC and CMP will be ordered today.  - If the initial liver enzymes are within normal limits, a prescription for naltrexone will be sent to her pharmacy.  - She will need to repeat liver enzyme tests in a month to ensure the medication is not causing liver damage.    3. Anxiety - Inconsistent use of sertraline  - She is advised to avoid alcohol while taking sertraline due to potential side effects.  - A prescription for sertraline 50 mg PO daily will be sent to her " pharmacy.    4. Oral herpes - No current flare-ups  - She does not need a refill of valacyclovir as she is not experiencing any flare-ups.    Follow-up  - The patient will follow up in 1 month.      Return in about 1 month (around 2/14/2025).    I have placed the below orders and discussed them with an approved delegating provider. The MA is performing the below orders under the direction of Dr. Tolentino.      Please note that this dictation was created using voice recognition software. I have made every reasonable attempt to correct obvious errors, but I expect that there are errors of grammar and possibly content that I did not discover before finalizing the note.

## 2025-03-09 DIAGNOSIS — A60.9 HSV (HERPES SIMPLEX VIRUS) ANOGENITAL INFECTION: ICD-10-CM

## 2025-03-11 DIAGNOSIS — Z30.41 ENCOUNTER FOR SURVEILLANCE OF CONTRACEPTIVE PILLS: ICD-10-CM

## 2025-03-12 RX ORDER — NORGESTIMATE AND ETHINYL ESTRADIOL 0.25-0.035
1 KIT ORAL DAILY
Qty: 84 TABLET | Refills: 0 | OUTPATIENT
Start: 2025-03-12

## 2025-03-12 RX ORDER — VALACYCLOVIR HYDROCHLORIDE 1 G/1
1000 TABLET, FILM COATED ORAL 2 TIMES DAILY
Qty: 20 TABLET | Refills: 0 | Status: SHIPPED | OUTPATIENT
Start: 2025-03-12

## 2025-03-12 NOTE — TELEPHONE ENCOUNTER
Received request via: Pharmacy    Was the patient seen in the last year in this department? Yes    Does the patient have an active prescription (recently filled or refills available) for medication(s) requested? No    [facility]    Does the patient have prison Plus and need 100-day supply? (This applies to ALL medications) Patient does not have SCP

## 2025-05-28 DIAGNOSIS — Z30.41 ENCOUNTER FOR SURVEILLANCE OF CONTRACEPTIVE PILLS: ICD-10-CM

## 2025-05-28 RX ORDER — NORGESTIMATE AND ETHINYL ESTRADIOL 0.25-0.035
1 KIT ORAL DAILY
Qty: 84 TABLET | Refills: 0 | Status: SHIPPED | OUTPATIENT
Start: 2025-05-28

## 2025-07-05 DIAGNOSIS — F10.10 ALCOHOL ABUSE: ICD-10-CM

## 2025-07-07 RX ORDER — NALTREXONE HYDROCHLORIDE 50 MG/1
50 TABLET, FILM COATED ORAL DAILY
Qty: 90 TABLET | Refills: 0 | Status: SHIPPED | OUTPATIENT
Start: 2025-07-07

## 2025-07-07 NOTE — TELEPHONE ENCOUNTER
Received request via: Patient    Was the patient seen in the last year in this department? Yes    Does the patient have an active prescription (recently filled or refills available) for medication(s) requested? No    Pharmacy Name: [facility] [address] [place] [address], [place] [address]     Does the patient have long term Plus and need 100-day supply? (This applies to ALL medications) Patient does not have SCP

## 2025-07-30 ENCOUNTER — APPOINTMENT (OUTPATIENT)
Dept: MEDICAL GROUP | Facility: PHYSICIAN GROUP | Age: 25
End: 2025-07-30
Payer: COMMERCIAL

## 2025-08-19 DIAGNOSIS — Z30.41 ENCOUNTER FOR SURVEILLANCE OF CONTRACEPTIVE PILLS: ICD-10-CM

## 2025-08-19 RX ORDER — NORGESTIMATE AND ETHINYL ESTRADIOL 0.25-0.035
1 KIT ORAL DAILY
Qty: 84 TABLET | Refills: 0 | Status: SHIPPED | OUTPATIENT
Start: 2025-08-19